# Patient Record
Sex: FEMALE | Race: BLACK OR AFRICAN AMERICAN | NOT HISPANIC OR LATINO | URBAN - METROPOLITAN AREA
[De-identification: names, ages, dates, MRNs, and addresses within clinical notes are randomized per-mention and may not be internally consistent; named-entity substitution may affect disease eponyms.]

---

## 2017-06-05 ENCOUNTER — HISTORICAL (OUTPATIENT)
Dept: ADMINISTRATIVE | Facility: HOSPITAL | Age: 47
End: 2017-06-05

## 2017-06-05 LAB
ABS NEUT (OLG): 2.1 X10(3)/MCL (ref 2.1–9.2)
ALBUMIN SERPL-MCNC: 4.2 GM/DL (ref 3.4–5)
ALBUMIN/GLOB SERPL: 1 {RATIO}
ALP SERPL-CCNC: 59 UNIT/L (ref 20–120)
ALT SERPL-CCNC: 17 UNIT/L
AST SERPL-CCNC: 20 UNIT/L
BASOPHILS # BLD AUTO: 0.1 X10(3)/MCL
BASOPHILS NFR BLD AUTO: 2 % (ref 0–1)
BILIRUB SERPL-MCNC: 0.7 MG/DL
BILIRUBIN DIRECT+TOT PNL SERPL-MCNC: <0.1 MG/DL
BILIRUBIN DIRECT+TOT PNL SERPL-MCNC: >0.6 MG/DL
BUN SERPL-MCNC: 10 MG/DL (ref 7–25)
CALCIUM SERPL-MCNC: 9.5 MG/DL (ref 8.4–10.3)
CHLORIDE SERPL-SCNC: 103 MMOL/L (ref 96–110)
CHOLEST SERPL-MCNC: 222 MG/DL
CHOLEST/HDLC SERPL: 3.2 {RATIO} (ref 0–4.4)
CO2 SERPL-SCNC: 27 MMOL/L (ref 24–32)
CREAT SERPL-MCNC: 0.66 MG/DL (ref 0.7–1.1)
EOSINOPHIL # BLD AUTO: 0.24 X10(3)/MCL
EOSINOPHIL NFR BLD AUTO: 5 % (ref 0–5)
ERYTHROCYTE [DISTWIDTH] IN BLOOD BY AUTOMATED COUNT: 17.7 % (ref 11.5–14.5)
EST. AVERAGE GLUCOSE BLD GHB EST-MCNC: 111 MG/DL
GLOBULIN SER-MCNC: 3.3 GM/ML (ref 2.3–3.5)
GLUCOSE SERPL-MCNC: 91 MG/DL (ref 65–99)
HBA1C MFR BLD: 5.5 % (ref 4.7–5.6)
HCT VFR BLD AUTO: 38.3 % (ref 35–46)
HDLC SERPL-MCNC: 70 MG/DL
HGB BLD-MCNC: 11.7 GM/DL (ref 12–16)
LDLC SERPL CALC-MCNC: 131 MG/DL (ref 0–130)
LYMPHOCYTES # BLD AUTO: 1.74 X10(3)/MCL
LYMPHOCYTES NFR BLD AUTO: 38 % (ref 15–40)
MCH RBC QN AUTO: 22.2 PG (ref 26–34)
MCHC RBC AUTO-ENTMCNC: 30.5 GM/DL (ref 31–37)
MCV RBC AUTO: 72.5 FL (ref 80–100)
MONOCYTES # BLD AUTO: 0.41 X10(3)/MCL
MONOCYTES NFR BLD AUTO: 9 % (ref 4–12)
NEUTROPHILS # BLD AUTO: 2.1 X10(3)/MCL
NEUTROPHILS NFR BLD AUTO: 46 X10(3)/MCL
PLATELET # BLD AUTO: 292 X10(3)/MCL (ref 130–400)
PMV BLD AUTO: 10.6 FL (ref 7.4–10.4)
POC BETA-HCG (QUAL): NEGATIVE
POTASSIUM SERPL-SCNC: 3.9 MMOL/L (ref 3.6–5.2)
PROT SERPL-MCNC: 7.5 GM/DL (ref 6–8)
RBC # BLD AUTO: 5.28 X10(6)/MCL (ref 4–5.2)
SODIUM SERPL-SCNC: 137 MMOL/L (ref 135–146)
TRIGL SERPL-MCNC: 106 MG/DL
VLDLC SERPL CALC-MCNC: 21 MG/DL
WBC # SPEC AUTO: 4.6 X10(3)/MCL (ref 4.5–11)

## 2017-07-12 ENCOUNTER — HISTORICAL (OUTPATIENT)
Dept: ADMINISTRATIVE | Facility: HOSPITAL | Age: 47
End: 2017-07-12

## 2017-07-12 LAB
APPEARANCE, UA: CLEAR
BACTERIA #/AREA URNS AUTO: ABNORMAL /[HPF]
BILIRUB UR QL STRIP: NEGATIVE
COLOR UR: NORMAL
GLUCOSE (UA): NORMAL
HGB UR QL STRIP: NEGATIVE
HYALINE CASTS #/AREA URNS LPF: ABNORMAL /[LPF]
KETONES UR QL STRIP: NEGATIVE
LEUKOCYTE ESTERASE UR QL STRIP: NEGATIVE
NITRITE UR QL STRIP: NEGATIVE
PH UR STRIP: 7 [PH] (ref 4.5–8)
PROT UR QL STRIP: NEGATIVE
RBC #/AREA URNS AUTO: ABNORMAL /[HPF]
SP GR UR STRIP: 1 (ref 1–1.03)
SQUAMOUS #/AREA URNS LPF: ABNORMAL /[LPF]
UROBILINOGEN UR STRIP-ACNC: NORMAL
WBC #/AREA URNS AUTO: ABNORMAL /HPF

## 2017-07-14 LAB — FINAL CULTURE: NO GROWTH

## 2017-09-18 ENCOUNTER — HISTORICAL (OUTPATIENT)
Dept: ADMINISTRATIVE | Facility: HOSPITAL | Age: 47
End: 2017-09-18

## 2017-09-18 LAB
APPEARANCE, UA: CLEAR
BACTERIA #/AREA URNS AUTO: ABNORMAL /[HPF]
BILIRUB UR QL STRIP: NEGATIVE
COLOR UR: NORMAL
GLUCOSE (UA): NORMAL
HGB UR QL STRIP: NEGATIVE
HYALINE CASTS #/AREA URNS LPF: ABNORMAL /[LPF]
KETONES UR QL STRIP: NEGATIVE
LEUKOCYTE ESTERASE UR QL STRIP: NEGATIVE
NITRITE UR QL STRIP: NEGATIVE
PH UR STRIP: 5.5 [PH] (ref 4.5–8)
PROT UR QL STRIP: NEGATIVE
RBC #/AREA URNS AUTO: ABNORMAL /[HPF]
SP GR UR STRIP: 1.01 (ref 1–1.03)
SQUAMOUS #/AREA URNS LPF: ABNORMAL /[LPF]
UROBILINOGEN UR STRIP-ACNC: NORMAL
WBC #/AREA URNS AUTO: ABNORMAL /HPF

## 2017-10-02 ENCOUNTER — HISTORICAL (OUTPATIENT)
Dept: UROLOGY | Facility: CLINIC | Age: 47
End: 2017-10-02

## 2017-10-02 LAB
BUN SERPL-MCNC: 11 MG/DL (ref 7–18)
CALCIUM SERPL-MCNC: 9.4 MG/DL (ref 8.5–10.1)
CHLORIDE SERPL-SCNC: 105 MMOL/L (ref 98–107)
CO2 SERPL-SCNC: 28 MMOL/L (ref 21–32)
CREAT SERPL-MCNC: 0.8 MG/DL (ref 0.6–1.3)
GLUCOSE SERPL-MCNC: 97 MG/DL (ref 74–106)
POTASSIUM SERPL-SCNC: 4.1 MMOL/L (ref 3.5–5.1)
SODIUM SERPL-SCNC: 137 MMOL/L (ref 136–145)

## 2017-10-19 ENCOUNTER — HISTORICAL (OUTPATIENT)
Dept: RADIOLOGY | Facility: HOSPITAL | Age: 47
End: 2017-10-19

## 2018-04-04 ENCOUNTER — HISTORICAL (OUTPATIENT)
Dept: ADMINISTRATIVE | Facility: HOSPITAL | Age: 48
End: 2018-04-04

## 2018-04-04 LAB
ABS NEUT (OLG): 4.03 X10(3)/MCL (ref 2.1–9.2)
APPEARANCE, UA: CLEAR
BACTERIA #/AREA URNS AUTO: ABNORMAL /[HPF]
BASOPHILS # BLD AUTO: 0.11 X10(3)/MCL
BASOPHILS NFR BLD AUTO: 2 %
BILIRUB UR QL STRIP: NEGATIVE
COLOR UR: NORMAL
EOSINOPHIL # BLD AUTO: 0.14 X10(3)/MCL
EOSINOPHIL NFR BLD AUTO: 2 %
ERYTHROCYTE [DISTWIDTH] IN BLOOD BY AUTOMATED COUNT: 20.1 % (ref 11.5–14.5)
GLUCOSE (UA): NORMAL
HCT VFR BLD AUTO: 33.3 % (ref 35–46)
HGB BLD-MCNC: 10.2 GM/DL (ref 12–16)
HGB UR QL STRIP: NEGATIVE
HYALINE CASTS #/AREA URNS LPF: ABNORMAL /[LPF]
IMM GRANULOCYTES # BLD AUTO: 0.02 10*3/UL
IMM GRANULOCYTES NFR BLD AUTO: 0 %
KETONES UR QL STRIP: NEGATIVE
LEUKOCYTE ESTERASE UR QL STRIP: NEGATIVE
LYMPHOCYTES # BLD AUTO: 2.02 X10(3)/MCL
LYMPHOCYTES NFR BLD AUTO: 29 % (ref 13–40)
MCH RBC QN AUTO: 22.4 PG (ref 26–34)
MCHC RBC AUTO-ENTMCNC: 30.6 GM/DL (ref 31–37)
MCV RBC AUTO: 73 FL (ref 80–100)
MONOCYTES # BLD AUTO: 0.67 X10(3)/MCL
MONOCYTES NFR BLD AUTO: 10 % (ref 4–12)
NEUTROPHILS # BLD AUTO: 4.03 X10(3)/MCL
NEUTROPHILS NFR BLD AUTO: 58 X10(3)/MCL
NITRITE UR QL STRIP: NEGATIVE
PH UR STRIP: 7 [PH] (ref 4.5–8)
PLATELET # BLD AUTO: 361 X10(3)/MCL (ref 130–400)
PMV BLD AUTO: 9.4 FL (ref 7.4–10.4)
PROT UR QL STRIP: NEGATIVE
RBC # BLD AUTO: 4.56 X10(6)/MCL (ref 4–5.2)
RBC #/AREA URNS AUTO: ABNORMAL /[HPF]
RET# (OHS): 0.13 X10(6)/MCL (ref 0.02–0.08)
RETICULOCYTE COUNT AUTOMATED (OLG): 2.8 % (ref 0.5–1.5)
SP GR UR STRIP: 1.01 (ref 1–1.03)
SQUAMOUS #/AREA URNS LPF: ABNORMAL /[LPF]
TSH SERPL-ACNC: 0.49 MIU/L (ref 0.36–3.74)
UROBILINOGEN UR STRIP-ACNC: NORMAL
WBC # SPEC AUTO: 7 X10(3)/MCL (ref 4.5–11)
WBC #/AREA URNS AUTO: ABNORMAL /HPF

## 2018-04-05 ENCOUNTER — HISTORICAL (OUTPATIENT)
Dept: FAMILY MEDICINE | Facility: CLINIC | Age: 48
End: 2018-04-05

## 2019-02-20 ENCOUNTER — HISTORICAL (OUTPATIENT)
Dept: RADIOLOGY | Facility: HOSPITAL | Age: 49
End: 2019-02-20

## 2019-06-25 ENCOUNTER — HISTORICAL (OUTPATIENT)
Dept: ADMINISTRATIVE | Facility: HOSPITAL | Age: 49
End: 2019-06-25

## 2019-06-25 LAB
ABS NEUT (OLG): 3.11 X10(3)/MCL (ref 2.1–9.2)
ALBUMIN SERPL-MCNC: 3.9 GM/DL (ref 3.4–5)
ALBUMIN/GLOB SERPL: 1.1 RATIO (ref 1.1–2)
ALP SERPL-CCNC: 82 UNIT/L (ref 45–117)
ALT SERPL-CCNC: 34 UNIT/L (ref 12–78)
AST SERPL-CCNC: 21 UNIT/L (ref 15–37)
BASOPHILS # BLD AUTO: 0.1 X10(3)/MCL
BASOPHILS NFR BLD AUTO: 2 %
BILIRUB SERPL-MCNC: 0.6 MG/DL (ref 0.2–1)
BILIRUBIN DIRECT+TOT PNL SERPL-MCNC: 0.1 MG/DL
BILIRUBIN DIRECT+TOT PNL SERPL-MCNC: 0.5 MG/DL
BUN SERPL-MCNC: 11 MG/DL (ref 7–18)
CALCIUM SERPL-MCNC: 9.7 MG/DL (ref 8.5–10.1)
CHLORIDE SERPL-SCNC: 102 MMOL/L (ref 98–107)
CO2 SERPL-SCNC: 32 MMOL/L (ref 21–32)
CREAT SERPL-MCNC: 0.7 MG/DL (ref 0.6–1.3)
EOSINOPHIL # BLD AUTO: 0.16 10*3/UL
EOSINOPHIL NFR BLD AUTO: 3 %
ERYTHROCYTE [DISTWIDTH] IN BLOOD BY AUTOMATED COUNT: 13.6 % (ref 11.5–14.5)
EST. AVERAGE GLUCOSE BLD GHB EST-MCNC: 123 MG/DL
GLOBULIN SER-MCNC: 3.7 GM/ML (ref 2.3–3.5)
GLUCOSE SERPL-MCNC: 94 MG/DL (ref 74–106)
HBA1C MFR BLD: 5.9 % (ref 4.2–6.3)
HCT VFR BLD AUTO: 44.9 % (ref 35–46)
HGB BLD-MCNC: 15 GM/DL (ref 12–16)
IMM GRANULOCYTES # BLD AUTO: 0.02 10*3/UL
IMM GRANULOCYTES NFR BLD AUTO: 0 %
LYMPHOCYTES # BLD AUTO: 1.65 X10(3)/MCL
LYMPHOCYTES NFR BLD AUTO: 29 % (ref 13–40)
MCH RBC QN AUTO: 29 PG (ref 26–34)
MCHC RBC AUTO-ENTMCNC: 33.4 GM/DL (ref 31–37)
MCV RBC AUTO: 86.8 FL (ref 80–100)
MONOCYTES # BLD AUTO: 0.57 X10(3)/MCL
MONOCYTES NFR BLD AUTO: 10 % (ref 4–12)
NEUTROPHILS # BLD AUTO: 3.11 X10(3)/MCL
NEUTROPHILS NFR BLD AUTO: 55 X10(3)/MCL
PLATELET # BLD AUTO: 244 X10(3)/MCL (ref 130–400)
PMV BLD AUTO: 11.4 FL (ref 7.4–10.4)
POTASSIUM SERPL-SCNC: 3.3 MMOL/L (ref 3.5–5.1)
PROT SERPL-MCNC: 7.6 GM/DL (ref 6.4–8.2)
RBC # BLD AUTO: 5.17 X10(6)/MCL (ref 4–5.2)
SODIUM SERPL-SCNC: 137 MMOL/L (ref 136–145)
TSH SERPL-ACNC: 0.47 MIU/L (ref 0.36–3.74)
WBC # SPEC AUTO: 5.6 X10(3)/MCL (ref 4.5–11)

## 2019-06-27 ENCOUNTER — HISTORICAL (OUTPATIENT)
Dept: FAMILY MEDICINE | Facility: CLINIC | Age: 49
End: 2019-06-27

## 2019-07-15 ENCOUNTER — HISTORICAL (OUTPATIENT)
Dept: FAMILY MEDICINE | Facility: CLINIC | Age: 49
End: 2019-07-15

## 2019-07-15 ENCOUNTER — HISTORICAL (OUTPATIENT)
Dept: ADMINISTRATIVE | Facility: HOSPITAL | Age: 49
End: 2019-07-15

## 2019-07-15 LAB
BUN SERPL-MCNC: 8 MG/DL (ref 7–18)
BUN SERPL-MCNC: 9 MG/DL (ref 7–18)
CALCIUM SERPL-MCNC: 8.9 MG/DL (ref 8.5–10.1)
CALCIUM SERPL-MCNC: 9.5 MG/DL (ref 8.5–10.1)
CHLORIDE SERPL-SCNC: 103 MMOL/L (ref 98–107)
CHLORIDE SERPL-SCNC: 104 MMOL/L (ref 98–107)
CHOLEST SERPL-MCNC: 197 MG/DL
CHOLEST/HDLC SERPL: 2.9 {RATIO} (ref 0–4.4)
CO2 SERPL-SCNC: 33 MMOL/L (ref 21–32)
CO2 SERPL-SCNC: 33 MMOL/L (ref 21–32)
CREAT SERPL-MCNC: 0.7 MG/DL (ref 0.6–1.3)
CREAT SERPL-MCNC: 0.8 MG/DL (ref 0.6–1.3)
CREAT/UREA NIT SERPL: 10
CREAT/UREA NIT SERPL: 13
GLUCOSE SERPL-MCNC: 104 MG/DL (ref 74–106)
GLUCOSE SERPL-MCNC: 97 MG/DL (ref 74–106)
HDLC SERPL-MCNC: 67 MG/DL
LDLC SERPL CALC-MCNC: 106 MG/DL (ref 0–130)
MAGNESIUM SERPL-MCNC: 2.3 MG/DL (ref 1.6–2.6)
PHOSPHATE SERPL-MCNC: 2.1 MG/DL (ref 2.5–4.9)
POTASSIUM SERPL-SCNC: 2.7 MMOL/L (ref 3.5–5.1)
POTASSIUM SERPL-SCNC: 3.1 MMOL/L (ref 3.5–5.1)
SODIUM SERPL-SCNC: 139 MMOL/L (ref 136–145)
SODIUM SERPL-SCNC: 141 MMOL/L (ref 136–145)
TRIGL SERPL-MCNC: 121 MG/DL
VLDLC SERPL CALC-MCNC: 24 MG/DL

## 2019-07-18 ENCOUNTER — HISTORICAL (OUTPATIENT)
Dept: ADMINISTRATIVE | Facility: HOSPITAL | Age: 49
End: 2019-07-18

## 2019-07-18 LAB
BUN SERPL-MCNC: 8 MG/DL (ref 7–18)
CALCIUM SERPL-MCNC: 10 MG/DL (ref 8.5–10.1)
CHLORIDE SERPL-SCNC: 104 MMOL/L (ref 98–107)
CO2 SERPL-SCNC: 29 MMOL/L (ref 21–32)
CREAT SERPL-MCNC: 0.7 MG/DL (ref 0.6–1.3)
CREAT/UREA NIT SERPL: 11
GLUCOSE SERPL-MCNC: 88 MG/DL (ref 74–106)
POTASSIUM SERPL-SCNC: 4.3 MMOL/L (ref 3.5–5.1)
SODIUM SERPL-SCNC: 137 MMOL/L (ref 136–145)

## 2019-12-11 ENCOUNTER — HISTORICAL (OUTPATIENT)
Dept: CARDIOLOGY | Facility: HOSPITAL | Age: 49
End: 2019-12-11

## 2020-03-20 ENCOUNTER — HISTORICAL (OUTPATIENT)
Dept: ADMINISTRATIVE | Facility: HOSPITAL | Age: 50
End: 2020-03-20

## 2020-03-20 LAB
FLUAV AG UPPER RESP QL IA.RAPID: NEGATIVE
FLUBV AG UPPER RESP QL IA.RAPID: NEGATIVE

## 2020-04-07 ENCOUNTER — HISTORICAL (OUTPATIENT)
Dept: ADMINISTRATIVE | Facility: HOSPITAL | Age: 50
End: 2020-04-07

## 2020-04-09 LAB — FINAL CULTURE: NORMAL

## 2020-06-15 ENCOUNTER — HISTORICAL (OUTPATIENT)
Dept: ADMINISTRATIVE | Facility: HOSPITAL | Age: 50
End: 2020-06-15

## 2020-06-15 LAB
ABS NEUT (OLG): 3.38 X10(3)/MCL (ref 2.1–9.2)
ALBUMIN SERPL-MCNC: 3.9 GM/DL (ref 3.4–5)
ALBUMIN/GLOB SERPL: 1.1 RATIO (ref 1.1–2)
ALP SERPL-CCNC: 80 UNIT/L (ref 45–117)
ALT SERPL-CCNC: 30 UNIT/L (ref 12–78)
AST SERPL-CCNC: 18 UNIT/L (ref 15–37)
BASOPHILS # BLD AUTO: 0.1 X10(3)/MCL (ref 0–0.2)
BASOPHILS NFR BLD AUTO: 1 %
BILIRUB SERPL-MCNC: 0.4 MG/DL (ref 0.2–1)
BILIRUBIN DIRECT+TOT PNL SERPL-MCNC: 0.1 MG/DL (ref 0–0.2)
BILIRUBIN DIRECT+TOT PNL SERPL-MCNC: 0.3 MG/DL
BUN SERPL-MCNC: 7 MG/DL (ref 7–18)
CALCIUM SERPL-MCNC: 9.4 MG/DL (ref 8.5–10.1)
CHLORIDE SERPL-SCNC: 105 MMOL/L (ref 98–107)
CHOLEST SERPL-MCNC: 212 MG/DL
CHOLEST/HDLC SERPL: 2.6 {RATIO} (ref 0–4.4)
CO2 SERPL-SCNC: 28 MMOL/L (ref 21–32)
CREAT SERPL-MCNC: 0.7 MG/DL (ref 0.6–1.3)
CREAT UR-MCNC: 40 MG/DL
EOSINOPHIL # BLD AUTO: 0.1 X10(3)/MCL (ref 0–0.9)
EOSINOPHIL NFR BLD AUTO: 2 %
ERYTHROCYTE [DISTWIDTH] IN BLOOD BY AUTOMATED COUNT: 13.1 % (ref 11.5–14.5)
EST. AVERAGE GLUCOSE BLD GHB EST-MCNC: 108 MG/DL
GLOBULIN SER-MCNC: 3.7 GM/ML (ref 2.3–3.5)
GLUCOSE SERPL-MCNC: 95 MG/DL (ref 74–106)
HBA1C MFR BLD: 5.4 % (ref 4.2–6.3)
HCT VFR BLD AUTO: 40.3 % (ref 35–46)
HDLC SERPL-MCNC: 82 MG/DL (ref 40–59)
HGB BLD-MCNC: 13.4 GM/DL (ref 12–16)
IMM GRANULOCYTES # BLD AUTO: 0.02 10*3/UL
IMM GRANULOCYTES NFR BLD AUTO: 0 %
LDLC SERPL CALC-MCNC: 102 MG/DL
LYMPHOCYTES # BLD AUTO: 1.7 X10(3)/MCL (ref 0.6–4.6)
LYMPHOCYTES NFR BLD AUTO: 29 %
MCH RBC QN AUTO: 29.2 PG (ref 26–34)
MCHC RBC AUTO-ENTMCNC: 33.3 GM/DL (ref 31–37)
MCV RBC AUTO: 87.8 FL (ref 80–100)
MICROALBUMIN UR-MCNC: 6.1 MG/L (ref 0–19)
MICROALBUMIN/CREAT RATIO PNL UR: 15.2 MCG/MG CR (ref 0–29)
MONOCYTES # BLD AUTO: 0.6 X10(3)/MCL (ref 0.1–1.3)
MONOCYTES NFR BLD AUTO: 10 %
NEUTROPHILS # BLD AUTO: 3.38 X10(3)/MCL (ref 2.1–9.2)
NEUTROPHILS NFR BLD AUTO: 57 %
PLATELET # BLD AUTO: 212 X10(3)/MCL (ref 130–400)
PMV BLD AUTO: 10.8 FL (ref 7.4–10.4)
POTASSIUM SERPL-SCNC: 4.6 MMOL/L (ref 3.5–5.1)
PROT SERPL-MCNC: 7.6 GM/DL (ref 6.4–8.2)
RBC # BLD AUTO: 4.59 X10(6)/MCL (ref 4–5.2)
SODIUM SERPL-SCNC: 138 MMOL/L (ref 136–145)
TRIGL SERPL-MCNC: 141 MG/DL
TSH SERPL-ACNC: 1.45 MIU/L (ref 0.36–3.74)
VLDLC SERPL CALC-MCNC: 28 MG/DL
WBC # SPEC AUTO: 6 X10(3)/MCL (ref 4.5–11)

## 2021-04-20 LAB — CRC RECOMMENDATION EXT: NORMAL

## 2021-05-24 LAB
HUMAN PAPILLOMAVIRUS (HPV): NORMAL
PAP RECOMMENDATION EXT: NORMAL
PAP SMEAR: NORMAL

## 2022-04-11 ENCOUNTER — HISTORICAL (OUTPATIENT)
Dept: ADMINISTRATIVE | Facility: HOSPITAL | Age: 52
End: 2022-04-11

## 2022-04-28 VITALS
SYSTOLIC BLOOD PRESSURE: 125 MMHG | BODY MASS INDEX: 35.68 KG/M2 | WEIGHT: 209 LBS | HEIGHT: 64 IN | DIASTOLIC BLOOD PRESSURE: 85 MMHG | OXYGEN SATURATION: 99 %

## 2022-04-30 NOTE — PROGRESS NOTES
Patient:   Kelly Levin             MRN: [id number]            FIN: [id number]               Age:   47 years     Sex:  Female     :  1970   Associated Diagnoses:   Acute UTI; Dysuria   Author:   Palomo Jackson MD      CC: Increased urinary frequency, dysuria x 3 days    HPI:   Pt is 47 AAF who presents to [facility]IC because of dysuria, increased frequency over past few days. Pt reports she began experiencing frequent urination despite decreased intake about three days ago, noticed mild burning with urination as well. Reports feeling of clear discharge, no odor, denies new sexual contacts. States she always uses condoms with sexual intercourse. No other major issues today. Denies abdominal pain, fever, N/V, malaise, hematuria.           Review of Systems   Constitutional:  No fever, No chills, No fatigue.    Respiratory:  No shortness of breath, No cough, No wheezing.    Cardiovascular:  No chest pain.    Gastrointestinal:  No nausea, No vomiting, No diarrhea, No constipation.    Genitourinary:  Dysuria, No hematuria.    Immunologic:  No malaise.       Health Status   Current medications:    Home Medications (7) Active  Allegra 24 Hour Allergy   Bactrim  mg-160 mg oral tablet 1 tab(s), Oral, BID  Mobic 7.5 mg oral tablet 7.5 mg = 1 tab(s), PRN, Oral, BID  One-A-Day Essentials oral tablet 1 tab(s), Oral, Daily  pravastatin 20 mg oral tablet 20 mg = 1 tab(s), Oral, Daily  Zegerid 20 mg-1100 mg oral capsule 1 cap(s), Oral, Daily  ZyrTEC         Physical Examination   Vital Signs   2017 9:06 CDT       Temperature Oral          36.8 DegC                             Peripheral Pulse Rate     89 bpm                             Respiratory Rate          20 br/min                             Systolic Blood Pressure   134 mmHg                             Diastolic Blood Pressure  82 mmHg     General:  Alert and oriented, No acute distress.    Respiratory:  Lungs are clear to auscultation, Respirations  are non-labored, Breath sounds are equal, Symmetrical chest wall expansion.    Cardiovascular:  Normal rate, Regular rhythm, No murmur, Normal peripheral perfusion.    Gastrointestinal:  Soft, Non-tender, Non-distended, Normal bowel sounds.    Genitourinary:  No costovertebral angle tenderness.    Musculoskeletal:  Normal range of motion.    Integumentary:  Warm, Dry.    Neurologic:  Alert, Oriented, No focal deficits.       Health Maintenance      Impression and Plan   Diagnosis     Acute UTI (ISU37-RN N39.0).     Dysuria (ZFJ70-PC R30.0).       - Provided pt with Bactrim x 3 days, sent urine for UA and urine culture  - Will tailor abx if necessary  - Instructed pt to come for f/u if discharge not resolved in 2 weeks  - WIC/ED precautions reviewed    RTC in 2 wks if necessary, otherwise keep previously scheduled appt

## 2022-05-11 ENCOUNTER — TELEPHONE (OUTPATIENT)
Dept: ENDOSCOPY | Facility: HOSPITAL | Age: 52
End: 2022-05-11

## 2022-09-21 LAB — BCS RECOMMENDATION EXT: NORMAL

## 2023-01-24 LAB
CHOLEST SERPL-MSCNC: 233 MG/DL (ref 0–200)
HBA1C MFR BLD: 5.3 %
HDLC SERPL-MCNC: 89 MG/DL (ref 35–70)
LDLC SERPL CALC-MCNC: 122 MG/DL (ref 0–160)
TRIGL SERPL-MCNC: 111 MG/DL (ref 40–160)

## 2023-04-27 PROBLEM — G43.829 MENSTRUAL MIGRAINE WITHOUT STATUS MIGRAINOSUS, NOT INTRACTABLE: Status: ACTIVE | Noted: 2023-04-27

## 2023-04-27 PROBLEM — E78.2 MIXED HYPERLIPIDEMIA: Status: ACTIVE | Noted: 2023-04-27

## 2023-04-27 PROBLEM — K21.9 GASTRIC REFLUX: Status: ACTIVE | Noted: 2023-04-27

## 2023-04-27 PROBLEM — J30.2 SEASONAL ALLERGIES: Status: ACTIVE | Noted: 2023-04-27

## 2023-04-27 PROBLEM — I10 ESSENTIAL HYPERTENSION: Status: ACTIVE | Noted: 2023-04-27

## 2023-05-15 DIAGNOSIS — J30.2 SEASONAL ALLERGIES: Primary | ICD-10-CM

## 2023-05-15 RX ORDER — LORATADINE 10 MG/1
10 TABLET ORAL DAILY
Qty: 90 TABLET | Refills: 3 | Status: SHIPPED | OUTPATIENT
Start: 2023-05-15

## 2023-05-15 RX ORDER — LORATADINE 10 MG/1
10 TABLET ORAL DAILY
COMMUNITY
Start: 2023-04-05 | End: 2023-05-15 | Stop reason: SDUPTHER

## 2023-06-19 ENCOUNTER — DOCUMENTATION ONLY (OUTPATIENT)
Dept: FAMILY MEDICINE | Facility: CLINIC | Age: 53
End: 2023-06-19

## 2023-06-19 ENCOUNTER — OFFICE VISIT (OUTPATIENT)
Dept: FAMILY MEDICINE | Facility: CLINIC | Age: 53
End: 2023-06-19
Payer: COMMERCIAL

## 2023-06-19 ENCOUNTER — PATIENT OUTREACH (OUTPATIENT)
Dept: ADMINISTRATIVE | Facility: HOSPITAL | Age: 53
End: 2023-06-19
Payer: COMMERCIAL

## 2023-06-19 VITALS
HEIGHT: 64 IN | TEMPERATURE: 98 F | DIASTOLIC BLOOD PRESSURE: 87 MMHG | SYSTOLIC BLOOD PRESSURE: 125 MMHG | OXYGEN SATURATION: 98 % | WEIGHT: 167.19 LBS | HEART RATE: 78 BPM | BODY MASS INDEX: 28.54 KG/M2

## 2023-06-19 DIAGNOSIS — J30.2 SEASONAL ALLERGIES: ICD-10-CM

## 2023-06-19 DIAGNOSIS — Z00.00 WELLNESS EXAMINATION: Primary | ICD-10-CM

## 2023-06-19 DIAGNOSIS — I10 ESSENTIAL HYPERTENSION: ICD-10-CM

## 2023-06-19 DIAGNOSIS — K21.9 GASTRIC REFLUX: ICD-10-CM

## 2023-06-19 DIAGNOSIS — E78.2 MIXED HYPERLIPIDEMIA: ICD-10-CM

## 2023-06-19 DIAGNOSIS — G43.829 MENSTRUAL MIGRAINE WITHOUT STATUS MIGRAINOSUS, NOT INTRACTABLE: ICD-10-CM

## 2023-06-19 DIAGNOSIS — F32.A ANXIETY AND DEPRESSION: ICD-10-CM

## 2023-06-19 DIAGNOSIS — F41.9 ANXIETY AND DEPRESSION: ICD-10-CM

## 2023-06-19 PROCEDURE — 1159F MED LIST DOCD IN RCRD: CPT | Mod: CPTII,,, | Performed by: FAMILY MEDICINE

## 2023-06-19 PROCEDURE — 99396 PREV VISIT EST AGE 40-64: CPT | Mod: ,,, | Performed by: FAMILY MEDICINE

## 2023-06-19 PROCEDURE — 99396 PR PREVENTIVE VISIT,EST,40-64: ICD-10-PCS | Mod: ,,, | Performed by: FAMILY MEDICINE

## 2023-06-19 PROCEDURE — 3044F HG A1C LEVEL LT 7.0%: CPT | Mod: CPTII,,, | Performed by: FAMILY MEDICINE

## 2023-06-19 PROCEDURE — 3074F PR MOST RECENT SYSTOLIC BLOOD PRESSURE < 130 MM HG: ICD-10-PCS | Mod: CPTII,,, | Performed by: FAMILY MEDICINE

## 2023-06-19 PROCEDURE — 3008F BODY MASS INDEX DOCD: CPT | Mod: CPTII,,, | Performed by: FAMILY MEDICINE

## 2023-06-19 PROCEDURE — 3079F PR MOST RECENT DIASTOLIC BLOOD PRESSURE 80-89 MM HG: ICD-10-PCS | Mod: CPTII,,, | Performed by: FAMILY MEDICINE

## 2023-06-19 PROCEDURE — 4010F PR ACE/ARB THEARPY RXD/TAKEN: ICD-10-PCS | Mod: CPTII,,, | Performed by: FAMILY MEDICINE

## 2023-06-19 PROCEDURE — 3074F SYST BP LT 130 MM HG: CPT | Mod: CPTII,,, | Performed by: FAMILY MEDICINE

## 2023-06-19 PROCEDURE — 1159F PR MEDICATION LIST DOCUMENTED IN MEDICAL RECORD: ICD-10-PCS | Mod: CPTII,,, | Performed by: FAMILY MEDICINE

## 2023-06-19 PROCEDURE — 4010F ACE/ARB THERAPY RXD/TAKEN: CPT | Mod: CPTII,,, | Performed by: FAMILY MEDICINE

## 2023-06-19 PROCEDURE — 3079F DIAST BP 80-89 MM HG: CPT | Mod: CPTII,,, | Performed by: FAMILY MEDICINE

## 2023-06-19 PROCEDURE — 3008F PR BODY MASS INDEX (BMI) DOCUMENTED: ICD-10-PCS | Mod: CPTII,,, | Performed by: FAMILY MEDICINE

## 2023-06-19 PROCEDURE — 3044F PR MOST RECENT HEMOGLOBIN A1C LEVEL <7.0%: ICD-10-PCS | Mod: CPTII,,, | Performed by: FAMILY MEDICINE

## 2023-06-19 RX ORDER — RIZATRIPTAN BENZOATE 10 MG/1
10 TABLET, ORALLY DISINTEGRATING ORAL
COMMUNITY
Start: 2023-03-10

## 2023-06-19 RX ORDER — ESCITALOPRAM OXALATE 5 MG/1
5 TABLET ORAL DAILY
Qty: 30 TABLET | Refills: 1 | Status: SHIPPED | OUTPATIENT
Start: 2023-06-19 | End: 2023-08-09

## 2023-06-19 RX ORDER — PANTOPRAZOLE SODIUM 40 MG/1
40 TABLET, DELAYED RELEASE ORAL DAILY
COMMUNITY
End: 2023-07-26

## 2023-06-19 NOTE — PROGRESS NOTES
The following record(s)  below were uploaded for Health Maintenance .            PAP SMEAR  2021

## 2023-06-19 NOTE — LETTER
"  This communication is flagged as high priority.        AUTHORIZATION FOR RELEASE OF   CONFIDENTIAL INFORMATION    Dear Staff CYRUS Soria,    We are seeing Kelly Levin, date of birth 1970, in the clinic at [facility]. Alice Beaulieu MD is the patient's PCP. Kelly Levin has an outstanding lab/procedure at the time we reviewed her chart. In order to help keep her health information updated, she has authorized us to request the following medical record(s):        (  )  MAMMOGRAM                                      (  )  COLONOSCOPY      (xx  )  PAP SMEAR                                          (  )  OUTSIDE LAB RESULTS     (  )  DEXA SCAN                                          (  )  EYE EXAM            (  )  FOOT EXAM                                          (  )  ENTIRE RECORD     (  )  OUTSIDE IMMUNIZATIONS                 (  )  _______________         Please fax records to [facility], Indira Gautam, MD,  [phone]      If you have any questions, please contact Bri Humphries (Connie)Care Coordinator @ [phone]     Patient Name: Kelly Levin  : 1970  Patient Phone #: [phone]     "

## 2023-06-19 NOTE — LETTER
"  This communication is flagged as high priority.        AUTHORIZATION FOR RELEASE OF   CONFIDENTIAL INFORMATION    Dear Staff,    We are seeing Kelly Levin, date of birth 1970, in the clinic at [facility] [place] [facility]. Alice Beaulieu MD is the patient's PCP. Kelly Levin has an outstanding lab/procedure at the time we reviewed her chart. In order to help keep her health information updated, she has authorized us to request the following medical record(s):        (  )  MAMMOGRAM                                      (xx  )  COLONOSCOPY/Path      (  )  PAP SMEAR                                          (  )  OUTSIDE LAB RESULTS     (  )  DEXA SCAN                                          (  )  EYE EXAM            (  )  FOOT EXAM                                          (  )  ENTIRE RECORD     (  )  OUTSIDE IMMUNIZATIONS                 (  )  _______________         Please fax records to [facility], Indira Gautam, MD,  [phone]        If you have any questions, please contact Bri Humphries (Connie)Care Coordinator @ [phone]       Patient Name: Kelly Levin  : 1970  Patient Phone #: [phone]     "

## 2023-06-19 NOTE — PROGRESS NOTES
Population Health Outreach.    Requested :Pap smear Tammie Dawn  Colon Screening Arabella  Mammogram [facility]

## 2023-06-19 NOTE — PROGRESS NOTES
Patient ID: [id number]     Chief Complaint: Annual Exam        HPI:     Kelly Levin is a 53 y.o. female here today for an annual wellness.  Patient had basic labs completed at work.   1) Has been having problems with anxiety for the last few years seems to be getting worse over time.  No recent acute stressors.  Symptoms include  feeling nervous, anxious, on edge, not being able to stop control worrying, trouble relaxing, becoming easily annoyed or irritable -which then leads to sadness, tearfulness-being overwhelmed.  Stressors and coping mechanisms discussed at length.   1) Hypertension: Patient has been taking medicine daily. BP is normal at home. No symptoms associated with increased BP such as headache/ visual changes/ dizziness/ chest pain.   2) Hyperlipidemia: Patient is taking medication at Night. Trying to follow modify diet. Increase activity. No side effects of medication noted.  3)  Reflux: Patient is continuing to take medication-no symptoms associated with reflux.  No noticeable side effects of medication.   4)  Migraine headaches:  Frequency/intensity have been controlled with use of medication. No acute complaints.          Past Medical History:   Diagnosis Date    Essential hypertension 4/27/2023    Gastric reflux 4/27/2023    Menstrual migraine without status migrainosus, not intractable 4/27/2023    Mixed hyperlipidemia 4/27/2023    Seasonal allergies 4/27/2023        Past Surgical History:   Procedure Laterality Date    REDUCTION OF BOTH BREASTS Bilateral 02/22/2022        Social History     Tobacco Use    Smoking status: Never    Smokeless tobacco: Never   Substance Use Topics    Alcohol use: Not Currently    Drug use: Never        Social History     Socioeconomic History    Marital status: Single    Number of children: 3        Current Outpatient Medications   Medication Instructions    EScitalopram oxalate (LEXAPRO) 5 mg, Oral, Daily    loratadine (CLARITIN) 10 mg, Oral, Daily     "losartan (COZAAR) 100 MG tablet Take 1 tablet by mouth once daily for 90 days    pantoprazole (PROTONIX) 40 mg, Oral, Daily    pravastatin (PRAVACHOL) 20 MG tablet Take 1 tablet by mouth once daily for 90 days    rizatriptan (MAXALT-MLT) 10 mg, Oral       Review of patient's allergies indicates:   Allergen Reactions    Ace inhibitors      Other reaction(s): Cough    Amlodipine Edema        Patient Care Team:  Alice Beaulieu MD as PCP - General (Family Medicine)  Tammie Dawn MD as Consulting Physician (Obstetrics and Gynecology)  JUDITH Bell MD as Consulting Physician (Gastroenterology)  Raul Will Jr., MD as Consulting Physician (Otolaryngology)     Subjective:     Review of Systems    12 point review of systems conducted, negative except as stated in the history of present illness. See HPI for details.      Objective:     Visit Vitals  /87   Pulse 78   Temp 98 °F (36.7 °C)   Ht 5' 4" (1.626 m)   Wt 75.8 kg (167 lb 3.2 oz)   SpO2 98%   BMI 28.70 kg/m²       Physical Exam    General: Alert and oriented, No acute distress.  Head: Normocephalic, Atraumatic.  Eye: Pupils are equal, round and reactive to light, Extraocular movements are intact, Sclera non-icteric.  Ears/Nose/Throat: Normal, Mucosa moist,Clear.  Neck/Thyroid: Supple, Non-tender, No palpable thyromegaly or thyroid nodule, No lymphadenopathy, Full range of motion.  Respiratory: Clear to auscultation bilaterally; No wheezes, rales or rhonchi  Cardiovascular: Regular rate and rhythm, S1/S2 normal, No murmurs, rubs or gallops.  Gastrointestinal: Soft, Non-tender, Non-distended, Normal bowel sounds, No palpable organomegaly.  Musculoskeletal: Normal range of motion.  Integumentary: Warm, Dry, Intact, No suspicious lesions or rashes.  Extremities: No clubbing, cyanosis or edema  Neurologic: No focal deficits, Cranial Nerves II-XII are grossly intact, Motor strength normal upper and lower extremities, Sensory exam intact.  Psychiatric: " "Normal interaction, Coherent speech, Euthymic mood, Appropriate affect       Assessment:       ICD-10-CM ICD-9-CM   1. Wellness examination  Z00.00 V70.0   2. Anxiety and depression  F41.9 300.00    F32.A 311   3. Essential hypertension  I10 401.9   4. Mixed hyperlipidemia  E78.2 272.2   5. Gastric reflux  K21.9 530.81   6. Seasonal allergies  J30.2 477.9   7. Menstrual migraine without status migrainosus, not intractable  G43.829 346.40        Plan:     Cervical Cancer Screening - Up to Date    Breast Cancer Screening - Up to Date    Colon Cancer Screening - Up to Date    Eye Exam - Recommend annually.    Dental Exam - Recommend biannual exams.     Vaccinations -   Immunization History   Administered Date(s) Administered    COVID-19, MRNA, LN-S, PF (Pfizer) (Gray Cap) 05/27/2022    COVID-19, MRNA, LN-S, PF (Pfizer) (Purple Cap) 03/07/2021, 03/28/2021, 01/06/2022    DT (Pediatric) 02/27/1975, 05/10/1985    DTP [birth year], 08/25/1970    Influenza 10/27/2010, 10/14/2011, 10/02/2017    Influenza - Quadrivalent 09/21/2016    Influenza - Quadrivalent - MDCK - PF 10/17/2019, 01/06/2022    Influenza - Quadrivalent - PF (6-35 months) 10/02/2017    Influenza - Quadrivalent - PF *Preferred* (6 months and older) 09/28/2020    Measles 03/05/1970    Measles / Rubella 06/27/1974, 03/19/1976    Poliovirus 1970, 1970, 08/25/1970, 06/27/1974, 03/19/1976    Td - PF (ADULT) 11/03/2008    Tdap 12/12/2017    Varicella 03/19/1976    Zoster Recombinant 01/06/2022, 04/11/2022          1. Wellness examination  Wellness: Five Rules Reviewed: Water/Nutrition-Real Food, Limit Fast Food/ Exercise 20-30 minutes most days of the week/ Mental health- "Is your work a calling or paycheck" Define 'What is your purpose-what matters to you' Stress- Is an Individual Response- Therefore Can be Controlled by the Individual. Meditation/ Immunizations/Multivitamin use-Vitamin D3/ Screenings-Patient had lipid " panel/hemoglobin A1c completed at work-these results were discussed.  Additional labs were given.    - CBC Auto Differential; Future  - Comprehensive Metabolic Panel; Future  - TSH; Future  - Urinalysis; Future  - Hepatitis C Antibody; Future  - HIV 1/2 Ag/Ab (4th Gen); Future  - Urinalysis    2. Anxiety and depression  Depression/Anxiety: Patient started on medication for depression/anxiety- continue to encourage lifestyle modifications including 20-30 minutes exercise daily/ meditation/ empower self through compassion. Patient feels well supported-does have individuals to talk to regarding her mood. Pathophysiology/Treatment/ Dangers Discussed.  Patient to call office with update.    - EScitalopram oxalate (LEXAPRO) 5 MG Tab; Take 1 tablet (5 mg total) by mouth once daily.  Dispense: 30 tablet; Refill: 1    3. Essential hypertension  Patient has been taking medication-losartan 100 mg. Blood pressure goal of less than 130/80-blood pressure is stable. Continue to encourage diet and activity modifications. Including stress management. Pathophysiology/treatment/dangers discussed.      4. Mixed hyperlipidemia  Lab Results   Component Value Date    CHOL 233 (A) 01/24/2023     06/15/2020    TRIG 111 01/24/2023    HDL 89 (A) 01/24/2023     Pathophysiology/treatment/dangers discussed. Patient to continue diet modification/increase pravastatin to 40 mg.   Total cholesterol 233 ( Goal less than 200) HDL 89 ( Goal high as possible)  ( Goal less than 100) Triglycerides 111 ( Goal less than 150)      5. Gastric reflux  Patient is currently stable.  No acute modifications recommended.  Continue with current treatment-Protonix 40 mg.    6. Seasonal allergies  Patient is currently stable-continue with Claritin 10 mg.  No acute modifications recommended.  Continue with current treatment.    7. Menstrual migraine without status migrainosus, not intractable  Patient is currently stable.  No acute modifications  recommended.  Continue with current treatment-Maxalt.            The patient's Health Maintenance was reviewed and the following appears to be due at this time:   Health Maintenance Due   Topic Date Due    Hepatitis C Screening  Never done    Cervical Cancer Screening  Never done    HIV Screening  Never done    Mammogram  Never done    Colorectal Cancer Screening  Never done    COVID-19 Vaccine (5 - Pfizer series) 07/22/2022    Hemoglobin A1c (Diabetic Prevention Screening)  06/15/2023         Follow up in about 6 weeks (around 7/31/2023). In addition to their scheduled follow up, the patient has also been instructed to follow up on as needed basis.     Future Appointments   Date Time Provider Department Center   7/24/2023  9:45 AM Alice Beaulieu MD [facility] [place] [facility] Beaulieu MD

## 2023-06-19 NOTE — LETTER
"  This communication is flagged as high priority.        AUTHORIZATION FOR RELEASE OF   CONFIDENTIAL INFORMATION    Dear FIDENCIO,    We are seeing Kelly Levin, date of birth 1970, in the clinic at [facility]. Alice Beaulieu MD is the patient's PCP. Kelly Levin has an outstanding lab/procedure at the time we reviewed her chart. In order to help keep her health information updated, she has authorized us to request the following medical record(s):        (  xx)  MAMMOGRAM                                      (  )  COLONOSCOPY      (  )  PAP SMEAR                                          (  )  OUTSIDE LAB RESULTS     (  )  DEXA SCAN                                          (  )  EYE EXAM            (  )  FOOT EXAM                                          (  )  ENTIRE RECORD     (  )  OUTSIDE IMMUNIZATIONS                 (  )  _______________         Please fax records to [facility], Indira Gautam, MD,  [phone]      If you have any questions, please contact Bri Humphries (Connie)Care Coordinator @ [phone]       Patient Name: Kelly Levin  : 1970  Patient Phone #: [phone]     "

## 2023-06-20 ENCOUNTER — PATIENT OUTREACH (OUTPATIENT)
Dept: ADMINISTRATIVE | Facility: HOSPITAL | Age: 53
End: 2023-06-20
Payer: COMMERCIAL

## 2023-06-20 NOTE — PROGRESS NOTES
The following record(s)  below were uploaded for Health Maintenance .    MAMMOGRAM SCREENING    09/21/2022        Colonoscopy 04/20/2021

## 2023-06-27 DIAGNOSIS — E78.2 MIXED HYPERLIPIDEMIA: Primary | ICD-10-CM

## 2023-06-27 RX ORDER — PRAVASTATIN SODIUM 40 MG/1
40 TABLET ORAL DAILY
Qty: 90 TABLET | Refills: 3 | Status: SHIPPED | OUTPATIENT
Start: 2023-06-27 | End: 2024-06-26

## 2023-07-26 DIAGNOSIS — K21.9 GASTRIC REFLUX: Primary | ICD-10-CM

## 2023-07-26 RX ORDER — PANTOPRAZOLE SODIUM 40 MG/1
TABLET, DELAYED RELEASE ORAL
Qty: 90 TABLET | Refills: 0 | Status: SHIPPED | OUTPATIENT
Start: 2023-07-26 | End: 2023-11-08

## 2023-08-07 DIAGNOSIS — F32.A ANXIETY AND DEPRESSION: ICD-10-CM

## 2023-08-07 DIAGNOSIS — F41.9 ANXIETY AND DEPRESSION: ICD-10-CM

## 2023-08-09 RX ORDER — ESCITALOPRAM OXALATE 5 MG/1
5 TABLET ORAL
Qty: 30 TABLET | Refills: 0 | Status: SHIPPED | OUTPATIENT
Start: 2023-08-09 | End: 2024-01-31

## 2023-10-23 DIAGNOSIS — I10 ESSENTIAL HYPERTENSION: Primary | ICD-10-CM

## 2023-10-23 RX ORDER — LOSARTAN POTASSIUM 100 MG/1
TABLET ORAL
Qty: 90 TABLET | Refills: 0 | Status: SHIPPED | OUTPATIENT
Start: 2023-10-23 | End: 2024-01-17

## 2023-11-08 DIAGNOSIS — K21.9 GASTRIC REFLUX: ICD-10-CM

## 2023-11-08 RX ORDER — PANTOPRAZOLE SODIUM 40 MG/1
TABLET, DELAYED RELEASE ORAL
Qty: 90 TABLET | Refills: 0 | Status: SHIPPED | OUTPATIENT
Start: 2023-11-08 | End: 2024-02-22

## 2024-01-17 DIAGNOSIS — I10 ESSENTIAL HYPERTENSION: ICD-10-CM

## 2024-01-17 RX ORDER — LOSARTAN POTASSIUM 100 MG/1
100 TABLET ORAL
Qty: 90 TABLET | Refills: 0 | Status: SHIPPED | OUTPATIENT
Start: 2024-01-17 | End: 2024-04-15

## 2024-01-30 DIAGNOSIS — F41.9 ANXIETY AND DEPRESSION: ICD-10-CM

## 2024-01-30 DIAGNOSIS — F32.A ANXIETY AND DEPRESSION: ICD-10-CM

## 2024-01-31 RX ORDER — ESCITALOPRAM OXALATE 5 MG/1
5 TABLET ORAL
Qty: 90 TABLET | Refills: 0 | Status: SHIPPED | OUTPATIENT
Start: 2024-01-31 | End: 2024-06-03

## 2024-02-22 DIAGNOSIS — K21.9 GASTRIC REFLUX: ICD-10-CM

## 2024-02-22 RX ORDER — PANTOPRAZOLE SODIUM 40 MG/1
TABLET, DELAYED RELEASE ORAL
Qty: 90 TABLET | Refills: 0 | Status: SHIPPED | OUTPATIENT
Start: 2024-02-22 | End: 2024-06-03 | Stop reason: SDUPTHER

## 2024-04-15 DIAGNOSIS — I10 ESSENTIAL HYPERTENSION: ICD-10-CM

## 2024-04-15 RX ORDER — LOSARTAN POTASSIUM 100 MG/1
100 TABLET ORAL
Qty: 90 TABLET | Refills: 0 | Status: SHIPPED | OUTPATIENT
Start: 2024-04-15 | End: 2024-06-03 | Stop reason: SDUPTHER

## 2024-05-27 ENCOUNTER — TELEPHONE (OUTPATIENT)
Dept: FAMILY MEDICINE | Facility: CLINIC | Age: 54
End: 2024-05-27
Payer: COMMERCIAL

## 2024-05-27 NOTE — TELEPHONE ENCOUNTER
Are there any outstanding tasks in the patients's chart (ex.labs,MM,etc)?  no  Do we have outstanding/pending referrals?  no  Has the patient been seen in and ER,UCC, or been admitted since last visit?  no  Has the patient seen any other health care provider(doctors) since last visit?  Yes, Dr Beaulieu  Has the patient had any bloodwork or x-rays done since last visit?  Yes      Last wellness was 06/19/2023

## 2024-06-03 ENCOUNTER — OFFICE VISIT (OUTPATIENT)
Dept: FAMILY MEDICINE | Facility: CLINIC | Age: 54
End: 2024-06-03
Payer: COMMERCIAL

## 2024-06-03 VITALS
SYSTOLIC BLOOD PRESSURE: 127 MMHG | OXYGEN SATURATION: 98 % | BODY MASS INDEX: 31.41 KG/M2 | DIASTOLIC BLOOD PRESSURE: 89 MMHG | HEART RATE: 79 BPM | WEIGHT: 184 LBS | RESPIRATION RATE: 16 BRPM | HEIGHT: 64 IN

## 2024-06-03 DIAGNOSIS — Z00.00 WELLNESS EXAMINATION: Primary | ICD-10-CM

## 2024-06-03 DIAGNOSIS — K21.9 GASTRIC REFLUX: ICD-10-CM

## 2024-06-03 DIAGNOSIS — I10 ESSENTIAL HYPERTENSION: ICD-10-CM

## 2024-06-03 DIAGNOSIS — F41.1 GENERALIZED ANXIETY DISORDER: Primary | ICD-10-CM

## 2024-06-03 DIAGNOSIS — E66.09 CLASS 1 OBESITY DUE TO EXCESS CALORIES WITH SERIOUS COMORBIDITY AND BODY MASS INDEX (BMI) OF 31.0 TO 31.9 IN ADULT: ICD-10-CM

## 2024-06-03 DIAGNOSIS — G43.829 MENSTRUAL MIGRAINE WITHOUT STATUS MIGRAINOSUS, NOT INTRACTABLE: ICD-10-CM

## 2024-06-03 DIAGNOSIS — E78.2 MIXED HYPERLIPIDEMIA: ICD-10-CM

## 2024-06-03 PROCEDURE — 3074F SYST BP LT 130 MM HG: CPT | Mod: CPTII,,, | Performed by: FAMILY MEDICINE

## 2024-06-03 PROCEDURE — 3008F BODY MASS INDEX DOCD: CPT | Mod: CPTII,,, | Performed by: FAMILY MEDICINE

## 2024-06-03 PROCEDURE — 1160F RVW MEDS BY RX/DR IN RCRD: CPT | Mod: CPTII,,, | Performed by: FAMILY MEDICINE

## 2024-06-03 PROCEDURE — 4010F ACE/ARB THERAPY RXD/TAKEN: CPT | Mod: CPTII,,, | Performed by: FAMILY MEDICINE

## 2024-06-03 PROCEDURE — 3079F DIAST BP 80-89 MM HG: CPT | Mod: CPTII,,, | Performed by: FAMILY MEDICINE

## 2024-06-03 PROCEDURE — 99214 OFFICE O/P EST MOD 30 MIN: CPT | Mod: ,,, | Performed by: FAMILY MEDICINE

## 2024-06-03 PROCEDURE — 1159F MED LIST DOCD IN RCRD: CPT | Mod: CPTII,,, | Performed by: FAMILY MEDICINE

## 2024-06-03 RX ORDER — PANTOPRAZOLE SODIUM 40 MG/1
40 TABLET, DELAYED RELEASE ORAL DAILY
Qty: 90 TABLET | Refills: 0 | Status: SHIPPED | OUTPATIENT
Start: 2024-06-03

## 2024-06-03 RX ORDER — BUSPIRONE HYDROCHLORIDE 5 MG/1
5 TABLET ORAL 2 TIMES DAILY PRN
Qty: 60 TABLET | Refills: 2 | Status: SHIPPED | OUTPATIENT
Start: 2024-06-03 | End: 2024-09-01

## 2024-06-03 RX ORDER — FLUTICASONE PROPIONATE 50 MCG
1 SPRAY, SUSPENSION (ML) NASAL DAILY
COMMUNITY

## 2024-06-03 RX ORDER — LOSARTAN POTASSIUM 100 MG/1
100 TABLET ORAL DAILY
Qty: 90 TABLET | Refills: 3 | Status: SHIPPED | OUTPATIENT
Start: 2024-06-03 | End: 2025-06-03

## 2024-06-03 RX ORDER — PRAVASTATIN SODIUM 40 MG/1
40 TABLET ORAL DAILY
Qty: 90 TABLET | Refills: 3 | Status: SHIPPED | OUTPATIENT
Start: 2024-06-03 | End: 2025-06-03

## 2024-06-03 RX ORDER — TIRZEPATIDE 2.5 MG/.5ML
2.5 INJECTION, SOLUTION SUBCUTANEOUS
Qty: 4 PEN | Refills: 1 | Status: SHIPPED | OUTPATIENT
Start: 2024-06-03 | End: 2024-08-02

## 2024-06-03 NOTE — PATIENT INSTRUCTIONS
Lj Mendoza,     If you are due for any health screening(s) below please notify me so we can arrange them to be ordered and scheduled. Most healthy patients at your age complete them, but you are free to accept or refuse.     If you can't do it, I'll definitely understand. If you can, I'd certainly appreciate it!    All of your core healthy metrics are met.

## 2024-06-03 NOTE — PROGRESS NOTES
Patient ID: [id number]     Chief Complaint: Establish Care        HPI:     Kelly Levin is a 54 y.o. female here today to establish care with a new MD, she had wellness done with previous PCP on 06/19/2023.   - Has been having problems with anxiety for the last few years seems to be getting worse over time.  No recent acute stressors.  Symptoms include  feeling nervous, anxious, on edge, not being able to stop control worrying, trouble relaxing, mind racing at night, becoming easily annoyed or irritable -which then leads to sadness, tearfulness-being overwhelmed.  Stressors and coping mechanisms discussed at length. She has tried Lexapro with drowsiness and she stopped taking the Lexapro several months ago. She denies depression, SI/HI, or AH/VH. She is not interested in outpatient counseling.   -  Hypertension: Patient has been taking medicine daily. BP is normal at home. No symptoms associated with increased BP such as headache/ visual changes/ dizziness/ chest pain.  She needs Rx refill of Losartan today.   -  Hyperlipidemia: Patient is taking medication at Night. Trying to follow modify diet. Increase activity. No side effects of medication noted. She needs Rx refill of Pravachol today  -  Reflux: Patient is continuing to take medication-no symptoms associated with reflux.  No noticeable side effects of medication. She needs a Rx refill of Protonix today.   - Migraine headaches:  Frequency/intensity have been controlled with use of medication. She has Rx Maxalt at home.  - She is obese, would like Rx to help with weight loss. She denies family history of MEN II or medullary thyroid cancer or personal history of pancreatitis. She is menopausal and she is not trying to get pregnant. She is not interested in seeing a dietician.   - She is not interested in HIV and Hep C screening.   Patient is without any other complaints today.         -------------------------------------    Essential hypertension     Gastric reflux    Menstrual migraine without status migrainosus, not intractable    Mixed hyperlipidemia    Seasonal allergies        Past Surgical History:   Procedure Laterality Date    COLONOSCOPY  04/20/2021    REDUCTION OF BOTH BREASTS Bilateral 02/22/2022       Review of patient's allergies indicates:   Allergen Reactions    Ace inhibitors      Other reaction(s): Cough    Amlodipine Edema       Outpatient Medications Marked as Taking for the 6/3/24 encounter (Office Visit) with Gladys Hernandez MD   Medication Sig Dispense Refill    fluticasone propionate (FLONASE) 50 mcg/actuation nasal spray 1 spray by Each Nostril route once daily.      loratadine (CLARITIN) 10 mg tablet Take 1 tablet (10 mg total) by mouth once daily. 90 tablet 3    rizatriptan (MAXALT-MLT) 10 MG disintegrating tablet Take 10 mg by mouth.      [DISCONTINUED] losartan (COZAAR) 100 MG tablet Take 1 tablet by mouth once daily 90 tablet 0    [DISCONTINUED] pantoprazole (PROTONIX) 40 MG tablet Take 1 tablet by mouth once daily 90 tablet 0    [DISCONTINUED] pravastatin (PRAVACHOL) 40 MG tablet Take 1 tablet (40 mg total) by mouth once daily. 90 tablet 3       Social History     Socioeconomic History    Marital status: Single    Number of children: 3   Occupational History    Occupation: [employer]: [employer]   Tobacco Use    Smoking status: Never    Smokeless tobacco: Never   Substance and Sexual Activity    Alcohol use: Not Currently    Drug use: Never    Sexual activity: Not Currently        Family History   Problem Relation Name Age of Onset    Hypertension Mother      Stroke Father          Subjective:       Review of Systems:    See HPI for details    Constitutional: Denies Change in appetite. Denies Chills. Denies Fever. Denies Night sweats.  Eye: Denies Blurred vision. Denies Discharge. Denies Eye pain.  ENT: Denies Decreased hearing. Denies Sore throat. Denies Swollen glands.  Respiratory: Denies Cough. Denies Shortness of breath.  "Denies Shortness of breath with exertion. Denies Wheezing.  Cardiovascular: Denies Chest pain at rest. Denies Chest pain with exertion. Denies Irregular heartbeat. Denies Palpitations.  Gastrointestinal: Denies Abdominal pain. Denies Diarrhea. Denies Nausea. Denies Vomiting. Denies Hematemesis or Hematochezia.  Genitourinary: Denies Dysuria. Denies Urinary frequency. Denies Urinary urgency. Denies Blood in urine.  Endocrine: Denies Cold intolerance. Denies Excessive thirst. Denies Heat intolerance. Denies Weight loss. Denies Weight gain.  Musculoskeletal: Denies Painful joints. Denies Weakness.  Integumentary: Denies Rash. Denies Itching. Denies Dry skin.  Neurologic: Denies Dizziness. Denies Fainting. Denies Headache.  Psychiatric: Denies Depression. Reports Anxiety. Denies Suicidal/Homicidal ideations.    All Other ROS: Negative except as stated in HPI.       Objective:     /89 (BP Location: Right arm, Patient Position: Sitting, BP Method: Large (Automatic))   Pulse 79   Resp 16   Ht 5' 4" (1.626 m)   Wt 83.5 kg (184 lb)   SpO2 98%   BMI 31.58 kg/m²     Physical Exam    General: Alert and oriented, No acute distress. Obese.   Head: Normocephalic, Atraumatic.  Eye: Pupils are equal, round and reactive to light, Extraocular movements are intact, Sclera non-icteric.  Ears/Nose/Throat: Normal, Mucosa moist,Clear.  Neck/Thyroid: Supple, Non-tender, No carotid bruit, No palpable thyromegaly or thyroid nodule, No lymphadenopathy, No JVD, Full range of motion.  Respiratory: Clear to auscultation bilaterally; No wheezes, rales or rhonchi,Non-labored respirations, Symmetrical chest wall expansion.  Cardiovascular: Regular rate and rhythm, S1/S2 normal, No murmurs, rubs or gallops.  Gastrointestinal: Soft, Non-tender, Non-distended, Normal bowel sounds, No palpable organomegaly.  Musculoskeletal: Normal range of motion.  Integumentary: Warm, Dry, Intact, No suspicious lesions or rashes.  Extremities: No clubbing, " cyanosis or edema  Neurologic: No focal deficits, Cranial Nerves II-XII are grossly intact, Motor strength normal upper and lower extremities, Sensory exam intact.  Psychiatric: Normal interaction, Coherent speech, Euthymic mood, Appropriate affect         Assessment:       ICD-10-CM ICD-9-CM   1. Generalized anxiety disorder  F41.1 300.02   2. Essential hypertension  I10 401.9   3. Mixed hyperlipidemia  E78.2 272.2   4. Gastric reflux  K21.9 530.81   5. Menstrual migraine without status migrainosus, not intractable  G43.829 346.40   6. Class 1 obesity due to excess calories with serious comorbidity and body mass index (BMI) of 31.0 to 31.9 in adult  E66.09 278.00    Z68.31 V85.31        Plan:     Problem List Items Addressed This Visit          Neuro    Menstrual migraine without status migrainosus, not intractable       Psychiatric    Generalized anxiety disorder - Primary    Relevant Medications    busPIRone (BUSPAR) 5 MG Tab       Cardiac/Vascular    Essential hypertension    Relevant Medications    losartan (COZAAR) 100 MG tablet    Mixed hyperlipidemia    Relevant Medications    pravastatin (PRAVACHOL) 40 MG tablet       GI    Gastric reflux    Relevant Medications    pantoprazole (PROTONIX) 40 MG tablet     Other Visit Diagnoses       Class 1 obesity due to excess calories with serious comorbidity and body mass index (BMI) of 31.0 to 31.9 in adult        Relevant Medications    tirzepatide, weight loss, (ZEPBOUND) 2.5 mg/0.5 mL PnIj         1. Generalized anxiety disorder  - Rx trial of busPIRone (BUSPAR) 5 MG Tab; Take 1 tablet (5 mg total) by mouth 2 (two) times daily as needed (anxiety).  Dispense: 60 tablet; Refill: 2  - Continue relaxation techniques. Will titrate medication as needed/tolerated. Notify M.D. or ER if symptoms persist or worsen, SI/HI, temp greater than 100.4, or any acute illness.    Start   /  Continue   Practice deep breathing or abdominal breathing exercises when anxiety  occurs.  Exercise daily. Get sunlight daily.  Avoid caffeine, alcohol and stimulants.  Practice positive phrases and repeat throughout the day, yoga, lavender scents or Chamomile tea will help anxiety.  Set healthy boundaries, avoid people and conversations that increase stress.  Reports any symptoms of suicidal or homicidal ideations immediately, if clinic is closed go to nearest emergency room.     2. Essential hypertension  - Rx losartan (COZAAR) 100 MG tablet; Take 1 tablet (100 mg total) by mouth once daily.  Dispense: 90 tablet; Refill: 3 refilled today.   - BP is well controlled. Continue Cozaar as prescribed. Keep daily BP log. Notify M.D. or ER if BP >170/100 or <90/60, chest pain, palpitations, headache, SOB, temp greater than 100.4, or any acute illness.   Continue  Low Sodium Diet (DASH Diet - Less than 2 grams of sodium per day).  Monitor blood pressure daily and log. Report consistent numbers greater than 140/90.  Smoking cessation encouraged to aid in BP reduction.  Maintain healthy weight with goal BMI <30. Exercise 30 minutes per day, 5 days per week.      3. Mixed hyperlipidemia  - Rx pravastatin (PRAVACHOL) 40 MG tablet; Take 1 tablet (40 mg total) by mouth once daily.  Dispense: 90 tablet; Refill: 3 refilled today.  - Continue Pravastatin as prescribed, will titrate Rx pending lab results.   Continue  Stressed importance of dietary modifications. Follow a low cholesterol, low saturated fat diet with less that 200mg of cholesterol a day.  Avoid fried foods and high saturated fats (high saturated fats less than 7% of calories).  Add Flax Seed/Fish Oil supplements to diet. Increase dietary fiber.  Regular exercise can reduce LDL and raise HDL. Stressed importance of physical activity 5 times per week for 30 minutes per day.      4. Gastric reflux  - Continue Rx Protonix; Rx pantoprazole (PROTONIX) 40 MG tablet; Take 1 tablet (40 mg total) by mouth once daily.  Dispense: 90 tablet; Refill: 0 refilled  today; GERD dietary precautions encouraged. Notify M.D. or ER if symptoms persist or worsen, abdominal pain vomiting, bloody stools, temp >100.4, or any acute illness.      5. Menstrual migraine without status migrainosus, not intractable  - Continue Maxalt p.r.n. Notify M.D. or ER if symptoms persist or worsen, headache refractory to treatment, temp >100.4, or any acute illness.      6. Class 1 obesity due to excess calories with serious comorbidity and body mass index (BMI) of 31.0 to 31.9 in adult  - Rx trial of tirzepatide, weight loss, (ZEPBOUND) 2.5 mg/0.5 mL PnIj; Inject 2.5 mg into the skin every 7 days.  Dispense: 4 Pen; Refill: 1  - Diet. exercise, and 10% weight loss encouraged. Rx trial of Zepbound with close monitoring to help with obesity. She agrees to avoid pregnancy with Zepbound. Will titrate Rx Zepbound as needed/tolerated until max dose achieved. Notify M.D. or ER if symptoms persist or worsen, adverse Rx side effects, pancreatitis, temp greater than 100.4, or any acute illness.    Body mass index is 31.58 kg/m².  Goal BMI <30.  Exercise 5 times a week for 30 minutes per day.  Avoid soda, simple sugars, excessive rice, potatoes or bread. Limit fast foods and fried foods.  Choose complex carbs in moderation (example: green vegetables, beans, oatmeal). Eat plenty of fresh fruits and vegetables with lean meats daily.  Do not skip meals. Eat a balanced portion size.  Avoid fad diets. Consider permanent healthy life style changes.        Kelly was seen today for establish care.    Diagnoses and all orders for this visit:    Generalized anxiety disorder  -     busPIRone (BUSPAR) 5 MG Tab; Take 1 tablet (5 mg total) by mouth 2 (two) times daily as needed (anxiety).    Essential hypertension  -     losartan (COZAAR) 100 MG tablet; Take 1 tablet (100 mg total) by mouth once daily.    Mixed hyperlipidemia  -     pravastatin (PRAVACHOL) 40 MG tablet; Take 1 tablet (40 mg total) by mouth once  daily.    Gastric reflux  -     pantoprazole (PROTONIX) 40 MG tablet; Take 1 tablet (40 mg total) by mouth once daily.    Menstrual migraine without status migrainosus, not intractable    Class 1 obesity due to excess calories with serious comorbidity and body mass index (BMI) of 31.0 to 31.9 in adult  -     tirzepatide, weight loss, (ZEPBOUND) 2.5 mg/0.5 mL PnIj; Inject 2.5 mg into the skin every 7 days.          Medication List with Changes/Refills   New Medications    BUSPIRONE (BUSPAR) 5 MG TAB    Take 1 tablet (5 mg total) by mouth 2 (two) times daily as needed (anxiety).       Start Date: 6/3/2024  End Date: 9/1/2024    TIRZEPATIDE, WEIGHT LOSS, (ZEPBOUND) 2.5 MG/0.5 ML PNIJ    Inject 2.5 mg into the skin every 7 days.       Start Date: 6/3/2024  End Date: 8/2/2024   Current Medications    FLUTICASONE PROPIONATE (FLONASE) 50 MCG/ACTUATION NASAL SPRAY    1 spray by Each Nostril route once daily.       Start Date: --        End Date: --    LORATADINE (CLARITIN) 10 MG TABLET    Take 1 tablet (10 mg total) by mouth once daily.       Start Date: 5/15/2023 End Date: --    RIZATRIPTAN (MAXALT-MLT) 10 MG DISINTEGRATING TABLET    Take 10 mg by mouth.       Start Date: 3/10/2023 End Date: --   Changed and/or Refilled Medications    Modified Medication Previous Medication    LOSARTAN (COZAAR) 100 MG TABLET losartan (COZAAR) 100 MG tablet       Take 1 tablet (100 mg total) by mouth once daily.    Take 1 tablet by mouth once daily       Start Date: 6/3/2024  End Date: 6/3/2025    Start Date: 4/15/2024 End Date: 6/3/2024    PANTOPRAZOLE (PROTONIX) 40 MG TABLET pantoprazole (PROTONIX) 40 MG tablet       Take 1 tablet (40 mg total) by mouth once daily.    Take 1 tablet by mouth once daily       Start Date: 6/3/2024  End Date: --    Start Date: 2/22/2024 End Date: 6/3/2024    PRAVASTATIN (PRAVACHOL) 40 MG TABLET pravastatin (PRAVACHOL) 40 MG tablet       Take 1 tablet (40 mg total) by mouth once daily.    Take 1 tablet (40 mg  total) by mouth once daily.       Start Date: 6/3/2024  End Date: 6/3/2025    Start Date: 6/27/2023 End Date: 6/3/2024   Discontinued Medications    ESCITALOPRAM OXALATE (LEXAPRO) 5 MG TAB    Take 1 tablet by mouth once daily       Start Date: 1/31/2024 End Date: 6/3/2024          Follow up in about 4 weeks (around 7/1/2024) for Wellness, Anxiety followup, Obesity Followup.

## 2024-06-17 DIAGNOSIS — J30.2 SEASONAL ALLERGIES: ICD-10-CM

## 2024-06-17 RX ORDER — LORATADINE 10 MG/1
10 TABLET ORAL DAILY
Qty: 90 TABLET | Refills: 3 | Status: SHIPPED | OUTPATIENT
Start: 2024-06-17

## 2024-07-08 ENCOUNTER — OFFICE VISIT (OUTPATIENT)
Dept: FAMILY MEDICINE | Facility: CLINIC | Age: 54
End: 2024-07-08
Payer: COMMERCIAL

## 2024-07-08 VITALS
BODY MASS INDEX: 32.95 KG/M2 | SYSTOLIC BLOOD PRESSURE: 125 MMHG | WEIGHT: 193 LBS | DIASTOLIC BLOOD PRESSURE: 84 MMHG | HEIGHT: 64 IN | OXYGEN SATURATION: 99 % | HEART RATE: 73 BPM

## 2024-07-08 DIAGNOSIS — F41.1 GENERALIZED ANXIETY DISORDER: ICD-10-CM

## 2024-07-08 DIAGNOSIS — K21.9 GASTRIC REFLUX: ICD-10-CM

## 2024-07-08 DIAGNOSIS — E78.2 MIXED HYPERLIPIDEMIA: ICD-10-CM

## 2024-07-08 DIAGNOSIS — Z00.00 WELLNESS EXAMINATION: Primary | ICD-10-CM

## 2024-07-08 DIAGNOSIS — I10 ESSENTIAL HYPERTENSION: ICD-10-CM

## 2024-07-08 DIAGNOSIS — G43.829 MENSTRUAL MIGRAINE WITHOUT STATUS MIGRAINOSUS, NOT INTRACTABLE: ICD-10-CM

## 2024-07-08 DIAGNOSIS — E66.09 CLASS 1 OBESITY DUE TO EXCESS CALORIES WITH SERIOUS COMORBIDITY AND BODY MASS INDEX (BMI) OF 33.0 TO 33.9 IN ADULT: ICD-10-CM

## 2024-07-08 PROBLEM — E66.811 CLASS 1 OBESITY DUE TO EXCESS CALORIES WITH SERIOUS COMORBIDITY AND BODY MASS INDEX (BMI) OF 31.0 TO 31.9 IN ADULT: Status: ACTIVE | Noted: 2024-07-08

## 2024-07-08 PROBLEM — G43.009 NONINTRACTABLE MIGRAINE: Status: ACTIVE | Noted: 2024-07-08

## 2024-07-08 PROBLEM — E66.811 CLASS 1 OBESITY DUE TO EXCESS CALORIES WITH SERIOUS COMORBIDITY AND BODY MASS INDEX (BMI) OF 33.0 TO 33.9 IN ADULT: Status: ACTIVE | Noted: 2024-07-08

## 2024-07-08 PROCEDURE — 1159F MED LIST DOCD IN RCRD: CPT | Mod: CPTII,,,

## 2024-07-08 PROCEDURE — 99396 PREV VISIT EST AGE 40-64: CPT | Mod: ,,,

## 2024-07-08 PROCEDURE — 3008F BODY MASS INDEX DOCD: CPT | Mod: CPTII,,,

## 2024-07-08 PROCEDURE — 1160F RVW MEDS BY RX/DR IN RCRD: CPT | Mod: CPTII,,,

## 2024-07-08 PROCEDURE — 3079F DIAST BP 80-89 MM HG: CPT | Mod: CPTII,,,

## 2024-07-08 PROCEDURE — 3044F HG A1C LEVEL LT 7.0%: CPT | Mod: CPTII,,,

## 2024-07-08 PROCEDURE — 4010F ACE/ARB THERAPY RXD/TAKEN: CPT | Mod: CPTII,,,

## 2024-07-08 PROCEDURE — 3074F SYST BP LT 130 MM HG: CPT | Mod: CPTII,,,

## 2024-07-08 RX ORDER — BUSPIRONE HYDROCHLORIDE 10 MG/1
10 TABLET ORAL 2 TIMES DAILY
Qty: 60 TABLET | Refills: 11 | Status: SHIPPED | OUTPATIENT
Start: 2024-07-08 | End: 2025-07-08

## 2024-07-09 NOTE — ASSESSMENT & PLAN NOTE
Monthly breast self exam recommended.  Diet, exercise and 10% weight loss encouraged.  Continue annual PAP and MMG annually.  Recommend biannual dental exam.

## 2024-07-09 NOTE — ASSESSMENT & PLAN NOTE
Body mass index is 33.13 kg/m².  Goal BMI <30.  Exercise 5 times a week for 30 minutes per day. Consider incorporating kettle bell weights as tolerated.   Avoid soda, simple sugars, excessive rice, potatoes or bread. Limit fast foods and fried foods.  Choose complex carbs in moderation (example: green vegetables, beans, oatmeal). Eat plenty of fresh fruits and vegetables with lean meats daily.  Do not skip meals. Prepare more meals at home. Eat a balanced portion size.  Avoid fad diets. Consider permanent healthy life style changes.

## 2024-07-09 NOTE — ASSESSMENT & PLAN NOTE
BP is at goal. Continue BP medications as prescribed. Will titrate BP Rx until BP is <140/90   Monitor blood pressure daily and log. Report consistent numbers greater than 138/88.  Stressed low sodium diet (DASH Diet - Less than 2 grams of sodium per day).  Goal BMI <30. Exercise 30 minutes per day, 5 days per week.   Notify M.D. or ER if BP >170/100 or <90/60, chest pain, palpitations, headache, SOB, temp greater than 100.4, or any acute illness.

## 2024-07-09 NOTE — ASSESSMENT & PLAN NOTE
Rx trail of dose increase buspirone 10 mg twice a day.Notify Provider of medication side effects.   Stressed incorporating relaxation techniques and coping strategies such as deep breathing, exercise, meditation, etc.  Follow up in 3-4 weeks.  Seek immediate medical treatment for SOB, persistent panic attack, chest pain, suicidal thoughts or hallucinations.

## 2024-07-09 NOTE — ASSESSMENT & PLAN NOTE
Continue pravastatin as prescribed every evening.  Notify the provider if you experience abdominal pain, muscle aches or cramps.  Stressed following a low cholesterol, low fat diet. Avoid fried foods and high saturated fats.  Increase fiber intake. Foods high in soluble fiber, such as oats, beans, lentils, fruits, and vegetables, can help lower LDL cholesterol levels.   Exercise/walk 5 times per week for 30 minutes a day. Regular physical activity can help raise HDL (high-density lipoprotein) cholesterol and lower LDL cholesterol.

## 2024-07-09 NOTE — ASSESSMENT & PLAN NOTE
Stable. Continue maxalt as needed. Present to ED if if headache worsen or does not response to treatment.

## 2024-07-09 NOTE — ASSESSMENT & PLAN NOTE
Stable. Continue pantoprazole 40 mg daily.  Avoid spicy, acidic, fried foods and alcohol.  Eat 2-3 hours before going to bed.  Avoid tight clothing, chew food thoroughly.  Reduce caffeine intake, avoid soda.

## 2024-07-17 ENCOUNTER — TELEPHONE (OUTPATIENT)
Dept: FAMILY MEDICINE | Facility: CLINIC | Age: 54
End: 2024-07-17
Payer: COMMERCIAL

## 2024-07-17 DIAGNOSIS — E66.09 CLASS 1 OBESITY DUE TO EXCESS CALORIES WITH SERIOUS COMORBIDITY AND BODY MASS INDEX (BMI) OF 33.0 TO 33.9 IN ADULT: Primary | ICD-10-CM

## 2024-07-17 RX ORDER — PHENTERMINE HYDROCHLORIDE 37.5 MG/1
18.75 TABLET ORAL
Qty: 30 TABLET | Refills: 0 | Status: SHIPPED | OUTPATIENT
Start: 2024-07-17 | End: 2024-08-16

## 2024-07-17 RX ORDER — SEMAGLUTIDE 0.25 MG/.5ML
0.25 INJECTION, SOLUTION SUBCUTANEOUS
Qty: 2 ML | Refills: 1 | Status: SHIPPED | OUTPATIENT
Start: 2024-07-17 | End: 2024-07-17

## 2024-07-17 NOTE — TELEPHONE ENCOUNTER
Pt is looking to get on Adipex. She was just here for her wellness on 07/08/24. Pt tried to get Zepbound but insurance denied. I sent her an EVISIT. She voiced she will get it done.

## 2024-07-17 NOTE — TELEPHONE ENCOUNTER
----- Message from Oscar Barr sent at 7/17/2024  2:13 PM CDT -----  .Type:  Needs Medical Advice    Who Called: Patient  Symptoms (please be specific):    How long has patient had these symptoms:    Pharmacy name and phone #:    Would the patient rather a call back or a response via [facility]?   Best Call Back Number: [phone]  Additional Information: Patient called requested to speak with Ms. Betty about her medication.

## 2024-07-17 NOTE — TELEPHONE ENCOUNTER
Called pt back and she voiced Wegovy is not covered by insurance she would like to start her trail of Adipex, thanks!

## 2024-08-19 ENCOUNTER — OFFICE VISIT (OUTPATIENT)
Dept: FAMILY MEDICINE | Facility: CLINIC | Age: 54
End: 2024-08-19
Payer: COMMERCIAL

## 2024-08-19 VITALS
HEIGHT: 64 IN | HEART RATE: 76 BPM | SYSTOLIC BLOOD PRESSURE: 114 MMHG | WEIGHT: 182.19 LBS | BODY MASS INDEX: 31.1 KG/M2 | DIASTOLIC BLOOD PRESSURE: 82 MMHG | OXYGEN SATURATION: 99 %

## 2024-08-19 DIAGNOSIS — F32.A ANXIETY AND DEPRESSION: Primary | ICD-10-CM

## 2024-08-19 DIAGNOSIS — E66.09 CLASS 1 OBESITY DUE TO EXCESS CALORIES WITH SERIOUS COMORBIDITY AND BODY MASS INDEX (BMI) OF 31.0 TO 31.9 IN ADULT: ICD-10-CM

## 2024-08-19 DIAGNOSIS — F41.9 ANXIETY AND DEPRESSION: Primary | ICD-10-CM

## 2024-08-19 PROCEDURE — 3074F SYST BP LT 130 MM HG: CPT | Mod: CPTII,,,

## 2024-08-19 PROCEDURE — 1159F MED LIST DOCD IN RCRD: CPT | Mod: CPTII,,,

## 2024-08-19 PROCEDURE — 3008F BODY MASS INDEX DOCD: CPT | Mod: CPTII,,,

## 2024-08-19 PROCEDURE — 1160F RVW MEDS BY RX/DR IN RCRD: CPT | Mod: CPTII,,,

## 2024-08-19 PROCEDURE — 3079F DIAST BP 80-89 MM HG: CPT | Mod: CPTII,,,

## 2024-08-19 PROCEDURE — 3044F HG A1C LEVEL LT 7.0%: CPT | Mod: CPTII,,,

## 2024-08-19 PROCEDURE — 4010F ACE/ARB THERAPY RXD/TAKEN: CPT | Mod: CPTII,,,

## 2024-08-19 PROCEDURE — 99213 OFFICE O/P EST LOW 20 MIN: CPT | Mod: ,,,

## 2024-08-19 PROCEDURE — G2211 COMPLEX E/M VISIT ADD ON: HCPCS | Mod: ,,,

## 2024-08-19 RX ORDER — PHENTERMINE HYDROCHLORIDE 37.5 MG/1
37.5 TABLET ORAL
COMMUNITY

## 2024-08-19 RX ORDER — BUSPIRONE HYDROCHLORIDE 5 MG/1
5 TABLET ORAL 2 TIMES DAILY
Qty: 60 TABLET | Refills: 2 | Status: SHIPPED | OUTPATIENT
Start: 2024-08-19 | End: 2024-11-17

## 2024-08-19 NOTE — PROGRESS NOTES
"  Patient ID: [id number]     Chief Complaint: Medication Refill    HPI:     Kelly Levin is a 54 y.o. female here today for a medication refill. She has Rx for phentermine 37.5 mg for weight loss. Patient reports she is taking a 1/2 tab (18.75 mg) twice a day with meals. Patient reports an 11 lbs weight lost since her last visit. She is eating 2-3 meals a day. She is exercising two times per week. She is tolerating the medication well and denies medication side effects. Denies palpitations, anorexia, dizziness or insomnia. She is requesting a refill of phentermine. Advised patient NP cannot rx weight loss medications. She must be evaluated face to face with the Medical Doctor.      Patient reports anxiety is controlled. She reports medication compliance daily and denies missing a dose. She denies feeling nervous or  irritable.  Medication dose (busPIRone) was increased to 10 mg BID, at last visit. Patient states "I don't like the way the medication makes me feel." "I feel like I am not myself." She reports she is taking 5 mg twice a day without sides effects. She would like to adjust her RX to reflect this current dose.      Past Medical History:   Diagnosis Date    Essential hypertension 04/27/2023    Gastric reflux 04/27/2023    Menstrual migraine without status migrainosus, not intractable 04/27/2023    Mixed hyperlipidemia 04/27/2023    Seasonal allergies 04/27/2023        Past Surgical History:   Procedure Laterality Date    COLONOSCOPY  04/20/2021    REDUCTION OF BOTH BREASTS Bilateral 02/22/2022        Social History     Tobacco Use    Smoking status: Never    Smokeless tobacco: Never   Substance and Sexual Activity    Alcohol use: Not Currently    Drug use: Never    Sexual activity: Not Currently        Current Outpatient Medications   Medication Instructions    busPIRone (BUSPAR) 5 mg, Oral, 2 times daily    fluticasone propionate (FLONASE) 50 mcg/actuation nasal spray 1 spray, Each Nostril, Daily    " "loratadine (CLARITIN) 10 mg, Oral, Daily    losartan (COZAAR) 100 mg, Oral, Daily    pantoprazole (PROTONIX) 40 mg, Oral, Daily    phentermine (ADIPEX-P) 37.5 mg, Oral, Before breakfast    pravastatin (PRAVACHOL) 40 mg, Oral, Daily    rizatriptan (MAXALT-MLT) 10 mg, Oral       Review of patient's allergies indicates:   Allergen Reactions    Ace inhibitors      Other reaction(s): Cough    Amlodipine Edema        Patient Care Team:  Gladys Hernandez MD as PCP - General (Family Medicine)  Tammie Dawn MD as Consulting Physician (Obstetrics and Gynecology)  JUDITH Bell MD as Consulting Physician (Gastroenterology)  Raul Will Jr., MD as Consulting Physician (Otolaryngology)     Subjective:     Review of Systems   Constitutional: Negative.    HENT: Negative.     Eyes: Negative.    Respiratory:  Negative for cough, chest tightness and shortness of breath.    Cardiovascular:  Negative for chest pain, palpitations and leg swelling.   Gastrointestinal:  Negative for abdominal pain, constipation, diarrhea, nausea and vomiting.   Endocrine: Negative for cold intolerance, heat intolerance, polydipsia, polyphagia and polyuria.   Genitourinary:  Negative for dysuria, flank pain, frequency, urgency and vaginal pain.   Musculoskeletal: Negative.    Skin: Negative.    Allergic/Immunologic: Negative.    Neurological: Negative.    Hematological: Negative.    Psychiatric/Behavioral:  Negative for agitation, self-injury, sleep disturbance and suicidal ideas. The patient is not nervous/anxious.      12 point review of systems conducted, negative except as stated in the history of present illness. See HPI for details.    Objective:     Visit Vitals  /82 (BP Location: Left arm, Patient Position: Sitting, BP Method: Large (Automatic))   Pulse 76   Ht 5' 4" (1.626 m)   Wt 82.6 kg (182 lb 3.2 oz)   SpO2 99%   BMI 31.27 kg/m²       Physical Exam  Vitals reviewed.   Constitutional:       General: She is not in " acute distress.     Appearance: Normal appearance.   HENT:      Mouth/Throat:      Mouth: Mucous membranes are moist.      Pharynx: Oropharynx is clear.   Eyes:      Conjunctiva/sclera: Conjunctivae normal.      Pupils: Pupils are equal, round, and reactive to light.   Cardiovascular:      Rate and Rhythm: Normal rate and regular rhythm.      Pulses: Normal pulses.      Heart sounds: Normal heart sounds.   Pulmonary:      Effort: Pulmonary effort is normal.      Breath sounds: Normal breath sounds.   Abdominal:      General: Bowel sounds are normal.   Musculoskeletal:         General: Normal range of motion.      Cervical back: Normal range of motion.      Right lower leg: No edema.      Left lower leg: No edema.   Skin:     General: Skin is warm and dry.      Capillary Refill: Capillary refill takes less than 2 seconds.   Neurological:      General: No focal deficit present.      Mental Status: She is alert and oriented to person, place, and time.   Psychiatric:         Mood and Affect: Mood normal.         Behavior: Behavior normal.         Thought Content: Thought content normal.         Judgment: Judgment normal.       Assessment:       ICD-10-CM ICD-9-CM   1. Anxiety and depression  F41.9 300.00    F32.A 311   2. Class 1 obesity due to excess calories with serious comorbidity and body mass index (BMI) of 31.0 to 31.9 in adult  E66.09 278.00    Z68.31 V85.31      Plan:     1. Anxiety and depression  Assessment & Plan:  Stable. Dose decrease of buspar to 5 mg twice a day. Will titrate dose as tolerated and for symptom resolution.   Continue relaxation techniques of positive affirmations, deep breathing and exercise.  Seek immediate medical treatment for SOB, persistent panic attack, chest pain, suicidal thoughts or hallucinations.      Orders:  -     busPIRone (BUSPAR) 5 MG Tab; Take 1 tablet (5 mg total) by mouth 2 (two) times daily.  Dispense: 60 tablet; Refill: 2    2. Class 1 obesity due to excess calories  with serious comorbidity and body mass index (BMI) of 31.0 to 31.9 in adult  Assessment & Plan:  Body mass index is 31.27 kg/m².  Goal BMI <30.  Increase exercise to 5 times a week for 30 minutes per day.  Consider prepping meals as discussed.  Schedule appointment with PCP to discuss weight loss medication management.  Avoid soda, simple sugars, excessive rice, potatoes or bread. Limit fast foods and fried foods.  Choose complex carbs in moderation (example: green vegetables, beans, oatmeal). Eat plenty of fresh fruits and vegetables with lean meats daily.  Do not skip meals. Eat a balanced portion size.  Avoid fad diets. Consider permanent healthy life style changes.              Schedule annual wellness. In addition to their scheduled follow up, the patient has also been instructed to follow up on as needed basis.     Future Appointments   Date Time Provider Department Center   7/9/2025  8:30 AM [facility]        [place] Mahmood

## 2024-08-19 NOTE — ASSESSMENT & PLAN NOTE
Stable. Dose decrease of buspar to 5 mg twice a day. Will titrate dose as tolerated and for symptom resolution.   Continue relaxation techniques of positive affirmations, deep breathing and exercise.  Seek immediate medical treatment for SOB, persistent panic attack, chest pain, suicidal thoughts or hallucinations.

## 2024-08-19 NOTE — ASSESSMENT & PLAN NOTE
Body mass index is 31.27 kg/m².  Goal BMI <30.  Increase exercise to 5 times a week for 30 minutes per day.  Consider prepping meals as discussed.  Schedule appointment with PCP to discuss weight loss medication management.  Avoid soda, simple sugars, excessive rice, potatoes or bread. Limit fast foods and fried foods.  Choose complex carbs in moderation (example: green vegetables, beans, oatmeal). Eat plenty of fresh fruits and vegetables with lean meats daily.  Do not skip meals. Eat a balanced portion size.  Avoid fad diets. Consider permanent healthy life style changes.

## 2024-08-21 ENCOUNTER — TELEPHONE (OUTPATIENT)
Dept: FAMILY MEDICINE | Facility: CLINIC | Age: 54
End: 2024-08-21
Payer: COMMERCIAL

## 2024-08-21 NOTE — TELEPHONE ENCOUNTER
----- Message from Jeny Palomares sent at 8/21/2024  8:13 AM CDT -----  Regarding: med  .Type:  Needs Medical Advice    Who Called: pt  Symptoms (please be specific):    How long has patient had these symptoms:    Pharmacy name and phone #:    Would the patient rather a call back or a response via [facility]?   Best Call Back Number:  [phone]  Additional Information: requesting refill on weight loss med

## 2024-08-27 ENCOUNTER — OFFICE VISIT (OUTPATIENT)
Dept: FAMILY MEDICINE | Facility: CLINIC | Age: 54
End: 2024-08-27
Payer: COMMERCIAL

## 2024-08-27 VITALS
SYSTOLIC BLOOD PRESSURE: 120 MMHG | HEIGHT: 64 IN | BODY MASS INDEX: 31.92 KG/M2 | WEIGHT: 187 LBS | DIASTOLIC BLOOD PRESSURE: 82 MMHG | RESPIRATION RATE: 16 BRPM | HEART RATE: 89 BPM | OXYGEN SATURATION: 99 %

## 2024-08-27 DIAGNOSIS — E83.52 HYPERCALCEMIA: ICD-10-CM

## 2024-08-27 DIAGNOSIS — F41.1 GENERALIZED ANXIETY DISORDER: Primary | ICD-10-CM

## 2024-08-27 DIAGNOSIS — D70.9 NEUTROPENIA, UNSPECIFIED TYPE: ICD-10-CM

## 2024-08-27 DIAGNOSIS — R31.21 ASYMPTOMATIC MICROSCOPIC HEMATURIA: ICD-10-CM

## 2024-08-27 DIAGNOSIS — E66.09 CLASS 1 OBESITY DUE TO EXCESS CALORIES WITH SERIOUS COMORBIDITY AND BODY MASS INDEX (BMI) OF 32.0 TO 32.9 IN ADULT: ICD-10-CM

## 2024-08-27 DIAGNOSIS — Z12.31 VISIT FOR SCREENING MAMMOGRAM: ICD-10-CM

## 2024-08-27 PROCEDURE — 3074F SYST BP LT 130 MM HG: CPT | Mod: CPTII,,, | Performed by: FAMILY MEDICINE

## 2024-08-27 PROCEDURE — 4010F ACE/ARB THERAPY RXD/TAKEN: CPT | Mod: CPTII,,, | Performed by: FAMILY MEDICINE

## 2024-08-27 PROCEDURE — 3008F BODY MASS INDEX DOCD: CPT | Mod: CPTII,,, | Performed by: FAMILY MEDICINE

## 2024-08-27 PROCEDURE — 3044F HG A1C LEVEL LT 7.0%: CPT | Mod: CPTII,,, | Performed by: FAMILY MEDICINE

## 2024-08-27 PROCEDURE — 99214 OFFICE O/P EST MOD 30 MIN: CPT | Mod: ,,, | Performed by: FAMILY MEDICINE

## 2024-08-27 PROCEDURE — 3079F DIAST BP 80-89 MM HG: CPT | Mod: CPTII,,, | Performed by: FAMILY MEDICINE

## 2024-08-27 PROCEDURE — 1160F RVW MEDS BY RX/DR IN RCRD: CPT | Mod: CPTII,,, | Performed by: FAMILY MEDICINE

## 2024-08-27 PROCEDURE — 1159F MED LIST DOCD IN RCRD: CPT | Mod: CPTII,,, | Performed by: FAMILY MEDICINE

## 2024-08-27 RX ORDER — PHENTERMINE HYDROCHLORIDE 37.5 MG/1
18.75 TABLET ORAL 2 TIMES DAILY WITH MEALS
Qty: 30 TABLET | Refills: 0 | Status: SHIPPED | OUTPATIENT
Start: 2024-08-27 | End: 2024-09-26

## 2024-08-27 RX ORDER — BUSPIRONE HYDROCHLORIDE 7.5 MG/1
7.5 TABLET ORAL 2 TIMES DAILY
Qty: 60 TABLET | Refills: 2 | Status: SHIPPED | OUTPATIENT
Start: 2024-08-27 | End: 2024-11-25

## 2024-08-27 NOTE — PROGRESS NOTES
Patient ID: [id number]     Chief Complaint: Medication Refill and Anxiety        HPI:     Kelly Levin is a 54 y.o. female here today for a follow up anxiety.  - Has been having problems with anxiety for the last few years seems to be getting worse over time.  No recent acute stressors.  Symptoms include  feeling nervous, anxious, on edge, not being able to stop control worrying, trouble relaxing, mind racing at night, becoming easily annoyed or irritable -which then leads to sadness, tearfulness-being overwhelmed.  Stressors and coping mechanisms discussed at length. She has tried Lexapro with drowsiness and she stopped taking the Lexapro several months ago. She has been taking Buspirone 5mg x bid with some improvement of symptoms, but not at goal, has tried a 10mg in the past with side effects, never tried a 7.5mg, she still has 5mg tablets at home. She denies depression, SI/HI, or AH/VH. She is not interested in outpatient counseling.   - She is here for followup obesity, doing well with Adipex, lost 6 pounds since last visit with Adipex and lifestyle changes, no side effects, she needs Rx refill today.   - She needs MMG ordered at [facility].   - Patient is without any other complaints today.       -------------------------------------    Essential hypertension    Gastric reflux    Menstrual migraine without status migrainosus, not intractable    Mixed hyperlipidemia    Seasonal allergies        Past Surgical History:   Procedure Laterality Date    COLONOSCOPY  04/20/2021    REDUCTION OF BOTH BREASTS Bilateral 02/22/2022       Review of patient's allergies indicates:   Allergen Reactions    Ace inhibitors      Other reaction(s): Cough    Amlodipine Edema       Outpatient Medications Marked as Taking for the 8/27/24 encounter (Office Visit) with Gladys Hernandez MD   Medication Sig Dispense Refill    fluticasone propionate (FLONASE) 50 mcg/actuation nasal spray 1 spray by Each Nostril route once daily.       loratadine (CLARITIN) 10 mg tablet Take 1 tablet (10 mg total) by mouth once daily. 90 tablet 3    losartan (COZAAR) 100 MG tablet Take 1 tablet (100 mg total) by mouth once daily. 90 tablet 3    pantoprazole (PROTONIX) 40 MG tablet Take 1 tablet (40 mg total) by mouth once daily. 90 tablet 0    pravastatin (PRAVACHOL) 40 MG tablet Take 1 tablet (40 mg total) by mouth once daily. 90 tablet 3    rizatriptan (MAXALT-MLT) 10 MG disintegrating tablet Take 10 mg by mouth.      [DISCONTINUED] busPIRone (BUSPAR) 5 MG Tab Take 1 tablet (5 mg total) by mouth 2 (two) times daily. 60 tablet 2    [DISCONTINUED] phentermine (ADIPEX-P) 37.5 mg tablet Take 37.5 mg by mouth before breakfast.         Social History     Socioeconomic History    Marital status: Single    Number of children: 3   Occupational History    Occupation: [employer]:    Tobacco Use    Smoking status: Never    Smokeless tobacco: Never   Substance and Sexual Activity    Alcohol use: Not Currently    Drug use: Never    Sexual activity: Not Currently     Social Determinants of Health     Financial Resource Strain: Low Risk  (7/8/2024)    Overall Financial Resource Strain (CARDIA)     Difficulty of Paying Living Expenses: Not hard at all   Food Insecurity: No Food Insecurity (7/8/2024)    Hunger Vital Sign     Worried About Running Out of Food in the Last Year: Never true     Ran Out of Food in the Last Year: Never true   Transportation Needs: No Transportation Needs (7/8/2024)    TRANSPORTATION NEEDS     Transportation : No   Physical Activity: Insufficiently Active (7/8/2024)    Exercise Vital Sign     Days of Exercise per Week: 2 days     Minutes of Exercise per Session: 30 min   Stress: Stress Concern Present (7/8/2024)    [place] [employer] - Occupational Stress Questionnaire     Feeling of Stress : To some extent   Housing Stability: Low Risk  (7/8/2024)    Housing Stability Vital Sign     Unable to Pay for Housing in the Last  "Year: No     Homeless in the Last Year: No        Family History   Problem Relation Name Age of Onset    Hypertension Mother      Stroke Father          Subjective:       Review of Systems:    See HPI for details    Constitutional: Denies Change in appetite. Denies Chills. Denies Fever. Denies Night sweats.  Eye: Denies Blurred vision. Denies Discharge. Denies Eye pain.  ENT: Denies Decreased hearing. Denies Sore throat. Denies Swollen glands.  Respiratory: Denies Cough. Denies Shortness of breath. Denies Shortness of breath with exertion. Denies Wheezing.  Cardiovascular: Denies Chest pain at rest. Denies Chest pain with exertion. Denies Irregular heartbeat. Denies Palpitations.  Gastrointestinal: Denies Abdominal pain. Denies Diarrhea. Denies Nausea. Denies Vomiting. Denies Hematemesis or Hematochezia.  Genitourinary: Denies Dysuria. Denies Urinary frequency. Denies Urinary urgency. Denies Blood in urine.  Endocrine: Denies Cold intolerance. Denies Excessive thirst. Denies Heat intolerance. Denies Weight loss. Denies Weight gain.  Musculoskeletal: Denies Painful joints. Denies Weakness.  Integumentary: Denies Rash. Denies Itching. Denies Dry skin.  Neurologic: Denies Dizziness. Denies Fainting. Denies Headache.  Psychiatric: Denies Depression. Reports Anxiety. Denies Suicidal/Homicidal ideations.    All Other ROS: Negative except as stated in HPI.       Objective:     /82 (BP Location: Left arm, Patient Position: Sitting, BP Method: Large (Automatic))   Pulse 89   Resp 16   Ht 5' 4" (1.626 m)   Wt 84.8 kg (187 lb)   SpO2 99%   BMI 32.10 kg/m²     Physical Exam    General: Alert and oriented, No acute distress. Obese.   Head: Normocephalic, Atraumatic.  Eye: Pupils are equal, round and reactive to light, Extraocular movements are intact, Sclera non-icteric.  Ears/Nose/Throat: Normal, Mucosa moist,Clear.  Neck/Thyroid: Supple, Non-tender, No carotid bruit, No palpable thyromegaly or thyroid nodule, No " lymphadenopathy, No JVD, Full range of motion.  Respiratory: Clear to auscultation bilaterally; No wheezes, rales or rhonchi,Non-labored respirations, Symmetrical chest wall expansion.  Cardiovascular: Regular rate and rhythm, S1/S2 normal, No murmurs, rubs or gallops.  Gastrointestinal: Soft, Non-tender, Non-distended, Normal bowel sounds, No palpable organomegaly.  Musculoskeletal: Normal range of motion.  Integumentary: Warm, Dry, Intact, No suspicious lesions or rashes.  Extremities: No clubbing, cyanosis or edema  Neurologic: No focal deficits, Cranial Nerves II-XII are grossly intact, Motor strength normal upper and lower extremities, Sensory exam intact.  Psychiatric: Normal interaction, Coherent speech, Euthymic mood, Appropriate affect     *Lab results from 07/01/2024 were reviewed and discussed with patient and patient voices understanding.*      Assessment:       ICD-10-CM ICD-9-CM   1. Generalized anxiety disorder  F41.1 300.02   2. Class 1 obesity due to excess calories with serious comorbidity and body mass index (BMI) of 32.0 to 32.9 in adult  E66.09 278.00    Z68.32 V85.32   3. Neutropenia, unspecified type  D70.9 288.00   4. Hypercalcemia  E83.52 275.42   5. Asymptomatic microscopic hematuria  R31.21 599.72   6. Visit for screening mammogram  Z12.31 V76.12        Plan:     Problem List Items Addressed This Visit          Psychiatric    Generalized anxiety disorder - Primary    Relevant Medications    busPIRone (BUSPAR) 7.5 MG tablet     Other Visit Diagnoses       Class 1 obesity due to excess calories with serious comorbidity and body mass index (BMI) of 32.0 to 32.9 in adult        Relevant Medications    phentermine (ADIPEX-P) 37.5 mg tablet    Neutropenia, unspecified type        Relevant Orders    CBC Auto Differential    Hypercalcemia        Relevant Orders    Comprehensive Metabolic Panel    PTH, intact    Asymptomatic microscopic hematuria        Relevant Orders    Urinalysis, Reflex to  Urine Culture    Visit for screening mammogram        Relevant Orders    Mammo Digital Screening Bilat w/ Varun         1. Generalized anxiety disorder  - busPIRone (BUSPAR) 7.5 MG tablet; Take 1 tablet (7.5 mg total) by mouth 2 (two) times daily.  Dispense: 60 tablet; Refill: 2  - Rx trial of increased dose of Buspirone to 7.5mg x bid. Continue relaxation techniques. Will titrate medication as needed/tolerated. Notify M.D. or ER if symptoms persist or worsen, SI/HI, temp greater than 100.4, or any acute illness.    Start   /  Continue   Practice deep breathing or abdominal breathing exercises when anxiety occurs.  Exercise daily. Get sunlight daily.  Avoid caffeine, alcohol and stimulants.  Practice positive phrases and repeat throughout the day, yoga, lavender scents or Chamomile tea will help anxiety.  Set healthy boundaries, avoid people and conversations that increase stress.  Reports any symptoms of suicidal or homicidal ideations immediately, if clinic is closed go to nearest emergency room.     2. Class 1 obesity due to excess calories with serious comorbidity and body mass index (BMI) of 32.0 to 32.9 in adult  - phentermine (ADIPEX-P) 37.5 mg tablet; Take 0.5 tablets (18.75 mg total) by mouth 2 (two) times daily with meals.  Dispense: 30 tablet; Refill: 0  - Diet, exercise, and 10% weight loss encouraged. Will prescribe Adipex for max of 2 months. reviewed today. Will discontinue Adipex and patient to notify M.D. or ER if BP >140/90, chest pain, palpitations, SI/HI with Adipex. Notify M.D. or ER if temp greater than 100.4 or any acute illness.   Body mass index is 32.1 kg/m².  Goal BMI <30.  Exercise 5 times a week for 30 minutes per day.  Avoid soda, simple sugars, excessive rice, potatoes or bread. Limit fast foods and fried foods.  Choose complex carbs in moderation (example: green vegetables, beans, oatmeal). Eat plenty of fresh fruits and vegetables with lean meats daily.  Do not skip meals. Eat a  balanced portion size.  Avoid fad diets. Consider permanent healthy life style changes.      3. Neutropenia, unspecified type  - Asymptomatic, repeat CBC Auto Differential; Future, if WBC is still low, will proceed with neutropenia workup.     4. Hypercalcemia, asymptomatic  - Comprehensive Metabolic Panel; Future  - PTH, intact; Future  - Will treat pending results.     5. Asymptomatic microscopic hematuria  - Repeat Urinalysis, Reflex to Urine Culture; Future, if hematuria is still present, will proceed with CT abdomen/pelvis without contrast-stone study. Notify M.D. or ER if symptoms persist or worsen, gross hematuria, abdominal pain, vomiting, temp >100.4, or any acute illness.      6. Visit for screening mammogram  - Mammo Digital Screening Bilat w/ Varun; Future  - Mammo Digital Screening Bilat w/ Varun        Kelly was seen today for medication refill and anxiety.    Diagnoses and all orders for this visit:    Generalized anxiety disorder  -     busPIRone (BUSPAR) 7.5 MG tablet; Take 1 tablet (7.5 mg total) by mouth 2 (two) times daily.    Class 1 obesity due to excess calories with serious comorbidity and body mass index (BMI) of 32.0 to 32.9 in adult  -     phentermine (ADIPEX-P) 37.5 mg tablet; Take 0.5 tablets (18.75 mg total) by mouth 2 (two) times daily with meals.    Neutropenia, unspecified type  -     CBC Auto Differential; Future    Hypercalcemia  -     Comprehensive Metabolic Panel; Future  -     PTH, intact; Future    Asymptomatic microscopic hematuria  -     Urinalysis, Reflex to Urine Culture; Future    Visit for screening mammogram  -     Mammo Digital Screening Bilat w/ Varun; Future  -     Mammo Digital Screening Bilat w/ Varun          Medication List with Changes/Refills   Current Medications    FLUTICASONE PROPIONATE (FLONASE) 50 MCG/ACTUATION NASAL SPRAY    1 spray by Each Nostril route once daily.       Start Date: --        End Date: --    LORATADINE (CLARITIN) 10 MG TABLET    Take 1  tablet (10 mg total) by mouth once daily.       Start Date: 6/17/2024 End Date: --    LOSARTAN (COZAAR) 100 MG TABLET    Take 1 tablet (100 mg total) by mouth once daily.       Start Date: 6/3/2024  End Date: 6/3/2025    PANTOPRAZOLE (PROTONIX) 40 MG TABLET    Take 1 tablet (40 mg total) by mouth once daily.       Start Date: 6/3/2024  End Date: --    PRAVASTATIN (PRAVACHOL) 40 MG TABLET    Take 1 tablet (40 mg total) by mouth once daily.       Start Date: 6/3/2024  End Date: 6/3/2025    RIZATRIPTAN (MAXALT-MLT) 10 MG DISINTEGRATING TABLET    Take 10 mg by mouth.       Start Date: 3/10/2023 End Date: --   Changed and/or Refilled Medications    Modified Medication Previous Medication    BUSPIRONE (BUSPAR) 7.5 MG TABLET busPIRone (BUSPAR) 5 MG Tab       Take 1 tablet (7.5 mg total) by mouth 2 (two) times daily.    Take 1 tablet (5 mg total) by mouth 2 (two) times daily.       Start Date: 8/27/2024 End Date: 11/25/2024    Start Date: 8/19/2024 End Date: 8/27/2024    PHENTERMINE (ADIPEX-P) 37.5 MG TABLET phentermine (ADIPEX-P) 37.5 mg tablet       Take 0.5 tablets (18.75 mg total) by mouth 2 (two) times daily with meals.    Take 37.5 mg by mouth before breakfast.       Start Date: 8/27/2024 End Date: 9/26/2024    Start Date: --        End Date: 8/27/2024          Follow up in about 4 weeks (around 9/24/2024) for Obesity Followup.

## 2024-09-17 ENCOUNTER — TELEPHONE (OUTPATIENT)
Dept: FAMILY MEDICINE | Facility: CLINIC | Age: 54
End: 2024-09-17
Payer: COMMERCIAL

## 2024-09-17 NOTE — TELEPHONE ENCOUNTER
Called pt to explain the nurses do not have full access to the schedule and that I would transfer the message to our

## 2024-09-17 NOTE — TELEPHONE ENCOUNTER
----- Message from Ganesh Cee sent at 9/17/2024 11:47 AM CDT -----  Who Called: Jerrodkanchan Levin    Caller is requesting a sooner appointment. Caller declined first available appointment listed below. Caller will not accept being placed on the waitlist and is requesting a message be sent to doctor.    When is the first available appointment?01/14  Options offered (Virtual Visit, Urgent Care):   Symptoms:sooner appt      Preferred Method of Contact: Phone Call  Patient's Preferred Phone Number on File: [phone]   Best Call Back Number, if different:  Additional Information: Pt states she needs a sooner appt for a med refill and she goes every 2 months.

## 2024-09-26 ENCOUNTER — TELEPHONE (OUTPATIENT)
Dept: FAMILY MEDICINE | Facility: CLINIC | Age: 54
End: 2024-09-26
Payer: COMMERCIAL

## 2024-09-26 ENCOUNTER — LAB VISIT (OUTPATIENT)
Dept: LAB | Facility: HOSPITAL | Age: 54
End: 2024-09-26
Attending: FAMILY MEDICINE
Payer: COMMERCIAL

## 2024-09-26 DIAGNOSIS — E21.3 HYPERPARATHYROIDISM: Primary | ICD-10-CM

## 2024-09-26 DIAGNOSIS — R31.21 ASYMPTOMATIC MICROSCOPIC HEMATURIA: ICD-10-CM

## 2024-09-26 LAB
BILIRUB UR QL STRIP.AUTO: NEGATIVE
CLARITY UR: CLEAR
COLOR UR AUTO: NORMAL
GLUCOSE UR QL STRIP: NEGATIVE
HGB UR QL STRIP: NEGATIVE
KETONES UR QL STRIP: NEGATIVE
LEUKOCYTE ESTERASE UR QL STRIP: NEGATIVE
NITRITE UR QL STRIP: NEGATIVE
PH UR STRIP: 6.5 [PH]
PROT UR QL STRIP: NEGATIVE
SP GR UR STRIP.AUTO: <=1.005 (ref 1–1.03)
UROBILINOGEN UR STRIP-ACNC: 0.2

## 2024-09-26 PROCEDURE — 81003 URINALYSIS AUTO W/O SCOPE: CPT

## 2024-09-26 NOTE — TELEPHONE ENCOUNTER
Pt sees Dr Raul Will, she was referred to dr Arellano and asked if she could see DR Will for the PTH issue. Please advise Thanks

## 2024-09-26 NOTE — TELEPHONE ENCOUNTER
----- Message from Oscar Barr sent at 9/26/2024  3:37 PM CDT -----  .Type:  Needs Medical Advice    Who Called: Demetreica  Symptoms (please be specific):    How long has patient had these symptoms:    Pharmacy name and phone #:    Would the patient rather a call back or a response via [facility]?   Best Call Back Number: [phone]  Additional Information: Patient requested a call back from the nurse she told me she talked to her earlier. Thanks,

## 2024-09-26 NOTE — TELEPHONE ENCOUNTER
----- Message from Oscar Barr sent at 9/26/2024  3:37 PM CDT -----  .Type:  Needs Medical Advice    Who Called: Demetreica  Symptoms (please be specific):    How long has patient had these symptoms:    Pharmacy name and phone #:    Would the patient rather a call back or a response via [facility]?   Best Call Back Number: [phone]  Additional Information: Patient requested a call back from the nurse she told me she talked to her earlier. Thanks,

## 2024-09-26 NOTE — TELEPHONE ENCOUNTER
----- Message from Gladys Hernandez MD sent at 9/26/2024 12:34 PM CDT -----  Intact PTH is elevated and suggestive of hyperparathyroidism. Hyperparathyroidism is when your parathyroid glands produce too much parathyroid hormone (PTH). This can cause high levels of calcium in your blood. Hyperparathyroidism can be primary (caused by growths or enlarged glands) or secondary (caused by kidney disease or low calcium levels). Treatments include surgery, medications and lifestyle changes. I placed an order for her to see an Endocrinologist for further evaluation and treatment.

## 2024-10-04 ENCOUNTER — OFFICE VISIT (OUTPATIENT)
Dept: FAMILY MEDICINE | Facility: CLINIC | Age: 54
End: 2024-10-04
Payer: COMMERCIAL

## 2024-10-04 VITALS
DIASTOLIC BLOOD PRESSURE: 86 MMHG | OXYGEN SATURATION: 98 % | BODY MASS INDEX: 31.58 KG/M2 | RESPIRATION RATE: 16 BRPM | HEIGHT: 64 IN | HEART RATE: 86 BPM | WEIGHT: 185 LBS | SYSTOLIC BLOOD PRESSURE: 125 MMHG

## 2024-10-04 DIAGNOSIS — E21.3 HYPERPARATHYROIDISM: ICD-10-CM

## 2024-10-04 DIAGNOSIS — F41.1 GENERALIZED ANXIETY DISORDER: Primary | ICD-10-CM

## 2024-10-04 DIAGNOSIS — E66.09 CLASS 1 OBESITY DUE TO EXCESS CALORIES WITH SERIOUS COMORBIDITY AND BODY MASS INDEX (BMI) OF 31.0 TO 31.9 IN ADULT: ICD-10-CM

## 2024-10-04 DIAGNOSIS — Z23 FLU VACCINE NEED: ICD-10-CM

## 2024-10-04 DIAGNOSIS — E66.811 CLASS 1 OBESITY DUE TO EXCESS CALORIES WITH SERIOUS COMORBIDITY AND BODY MASS INDEX (BMI) OF 31.0 TO 31.9 IN ADULT: ICD-10-CM

## 2024-10-04 RX ORDER — PHENTERMINE HYDROCHLORIDE 37.5 MG/1
18.75 TABLET ORAL 2 TIMES DAILY WITH MEALS
Qty: 30 TABLET | Refills: 0 | Status: SHIPPED | OUTPATIENT
Start: 2024-10-04 | End: 2024-11-03

## 2024-10-04 NOTE — PROGRESS NOTES
Patient ID: [id number]     Chief Complaint: Anxiety and Obesity        HPI:     Kelly Levin is a 54 y.o. female here today for a follow up anxiety, obesity.  - Has been having problems with anxiety for the last few years seems to be getting worse over time.  No recent acute stressors.  Symptoms include  feeling nervous, anxious, on edge, not being able to stop control worrying, trouble relaxing, mind racing at night, becoming easily annoyed or irritable -which then leads to sadness, tearfulness-being overwhelmed.  Stressors and coping mechanisms discussed at length. She has tried Lexapro with drowsiness and she stopped taking the Lexapro several months ago. She has been taking Buspirone 7.5 mg x bid with improvement of symptoms and she feels like she is at  her baseline. She denies depression, SI/HI, or AH/VH. She is not interested in outpatient counseling.   - She is here for followup obesity, doing well with Adipex, lost 2 pounds since last visit with Adipex and lifestyle changes, no side effects, she needs Rx refill today.  Last Rx Adipex refill was 08/27/2024.  - Hyperparathyroidism is stable and asymptomatic, she would like referral to her ENT (Dr. Will) and to cancel referral to Endocrinology.   - She would like flu vaccine today.   - Patient is without any other complaints today.          -------------------------------------    Essential hypertension    Gastric reflux    Menstrual migraine without status migrainosus, not intractable    Mixed hyperlipidemia    Seasonal allergies        Past Surgical History:   Procedure Laterality Date    COLONOSCOPY  04/20/2021    REDUCTION OF BOTH BREASTS Bilateral 02/22/2022       Review of patient's allergies indicates:   Allergen Reactions    Ace inhibitors      Other reaction(s): Cough    Amlodipine Edema       Outpatient Medications Marked as Taking for the 10/4/24 encounter (Office Visit) with Gladys Hernandez MD   Medication Sig Dispense Refill     busPIRone (BUSPAR) 7.5 MG tablet Take 1 tablet (7.5 mg total) by mouth 2 (two) times daily. 60 tablet 2    fluticasone propionate (FLONASE) 50 mcg/actuation nasal spray 1 spray by Each Nostril route once daily.      loratadine (CLARITIN) 10 mg tablet Take 1 tablet (10 mg total) by mouth once daily. 90 tablet 3    losartan (COZAAR) 100 MG tablet Take 1 tablet (100 mg total) by mouth once daily. 90 tablet 3    pantoprazole (PROTONIX) 40 MG tablet Take 1 tablet (40 mg total) by mouth once daily. 90 tablet 0    pravastatin (PRAVACHOL) 40 MG tablet Take 1 tablet (40 mg total) by mouth once daily. 90 tablet 3    rizatriptan (MAXALT-MLT) 10 MG disintegrating tablet Take 10 mg by mouth.       Current Facility-Administered Medications for the 10/4/24 encounter (Office Visit) with Gladys Hernandez MD   Medication Dose Route Frequency Provider Last Rate Last Admin    influenza (Flulaval, Fluzone, Fluarix) 45 mcg/0.5 mL IM vaccine (> or = 6 mo) 0.5 mL  0.5 mL Intramuscular 1 time in Clinic/HOD            Social History     Socioeconomic History    Marital status: Single    Number of children: 3   Occupational History    Occupation: [employer]:    Tobacco Use    Smoking status: Never    Smokeless tobacco: Never   Substance and Sexual Activity    Alcohol use: Not Currently    Drug use: Never    Sexual activity: Not Currently     Social Drivers of Health     Financial Resource Strain: Low Risk  (7/8/2024)    Overall Financial Resource Strain (CARDIA)     Difficulty of Paying Living Expenses: Not hard at all   Food Insecurity: No Food Insecurity (7/8/2024)    Hunger Vital Sign     Worried About Running Out of Food in the Last Year: Never true     Ran Out of Food in the Last Year: Never true   Transportation Needs: No Transportation Needs (7/8/2024)    TRANSPORTATION NEEDS     Transportation : No   Physical Activity: Insufficiently Active (7/8/2024)    Exercise Vital Sign     Days of Exercise per Week: 2 days      "Minutes of Exercise per Session: 30 min   Stress: Stress Concern Present (7/8/2024)    [employer] [place] [employer] - Occupational Stress Questionnaire     Feeling of Stress : To some extent   Housing Stability: Low Risk  (7/8/2024)    Housing Stability Vital Sign     Unable to Pay for Housing in the Last Year: No     Homeless in the Last Year: No        Family History   Problem Relation Name Age of Onset    Hypertension Mother      Stroke Father          Subjective:       Review of Systems:    See HPI for details    Constitutional: Denies Change in appetite. Denies Chills. Denies Fever. Denies Night sweats.  Eye: Denies Blurred vision. Denies Discharge. Denies Eye pain.  ENT: Denies Decreased hearing. Denies Sore throat. Denies Swollen glands.  Respiratory: Denies Cough. Denies Shortness of breath. Denies Shortness of breath with exertion. Denies Wheezing.  Cardiovascular: Denies Chest pain at rest. Denies Chest pain with exertion. Denies Irregular heartbeat. Denies Palpitations.  Gastrointestinal: Denies Abdominal pain. Denies Diarrhea. Denies Nausea. Denies Vomiting. Denies Hematemesis or Hematochezia.  Genitourinary: Denies Dysuria. Denies Urinary frequency. Denies Urinary urgency. Denies Blood in urine.  Endocrine: Denies Cold intolerance. Denies Excessive thirst. Denies Heat intolerance. Denies Weight loss. Denies Weight gain.  Musculoskeletal: Denies Painful joints. Denies Weakness.  Integumentary: Denies Rash. Denies Itching. Denies Dry skin.  Neurologic: Denies Dizziness. Denies Fainting. Denies Headache.  Psychiatric: Denies Depression. Reports Anxiety. Denies Suicidal/Homicidal ideations.    All Other ROS: Negative except as stated in HPI.       Objective:     /86 (BP Location: Right arm, Patient Position: Sitting)   Pulse 86   Resp 16   Ht 5' 4" (1.626 m)   Wt 83.9 kg (185 lb)   SpO2 98%   BMI 31.76 kg/m²     Physical Exam    General: Alert and oriented, No acute distress. Obese. "   Head: Normocephalic, Atraumatic.  Eye: Pupils are equal, round and reactive to light, Extraocular movements are intact, Sclera non-icteric.  Ears/Nose/Throat: Normal, Mucosa moist,Clear.  Neck/Thyroid: Supple, Non-tender, No carotid bruit, No palpable thyromegaly or thyroid nodule, No lymphadenopathy, No JVD, Full range of motion.  Respiratory: Clear to auscultation bilaterally; No wheezes, rales or rhonchi,Non-labored respirations, Symmetrical chest wall expansion.  Cardiovascular: Regular rate and rhythm, S1/S2 normal, No murmurs, rubs or gallops.  Gastrointestinal: Soft, Non-tender, Non-distended, Normal bowel sounds, No palpable organomegaly.  Musculoskeletal: Normal range of motion.  Integumentary: Warm, Dry, Intact, No suspicious lesions or rashes.  Extremities: No clubbing, cyanosis or edema  Neurologic: No focal deficits, Cranial Nerves II-XII are grossly intact, Motor strength normal upper and lower extremities, Sensory exam intact.  Psychiatric: Normal interaction, Coherent speech, Euthymic mood, Appropriate affect         Assessment:       ICD-10-CM ICD-9-CM   1. Generalized anxiety disorder  F41.1 300.02   2. Class 1 obesity due to excess calories with serious comorbidity and body mass index (BMI) of 31.0 to 31.9 in adult  E66.811 278.00    E66.09 V85.31    Z68.31    3. Flu vaccine need  Z23 V04.81   4. Hyperparathyroidism  E21.3 252.00        Plan:     Problem List Items Addressed This Visit          Psychiatric    Generalized anxiety disorder - Primary       Endocrine    Hyperparathyroidism    Relevant Orders    Ambulatory referral/consult to ENT     Other Visit Diagnoses       Class 1 obesity due to excess calories with serious comorbidity and body mass index (BMI) of 31.0 to 31.9 in adult        Relevant Medications    phentermine (ADIPEX-P) 37.5 mg tablet    Flu vaccine need        Relevant Medications    influenza (Flulaval, Fluzone, Fluarix) 45 mcg/0.5 mL IM vaccine (> or = 6 mo) 0.5 mL          1. Generalized anxiety disorder  - Continue Buspirone as directed. Continue relaxation techniques. Will titrate medication as needed/tolerated. Notify M.D. or ER if symptoms persist or worsen, SI/HI, temp greater than 100.4, or any acute illness.    Start   /  Continue   Practice deep breathing or abdominal breathing exercises when anxiety occurs.  Exercise daily. Get sunlight daily.  Avoid caffeine, alcohol and stimulants.  Practice positive phrases and repeat throughout the day, yoga, lavender scents or Chamomile tea will help anxiety.  Set healthy boundaries, avoid people and conversations that increase stress.  Reports any symptoms of suicidal or homicidal ideations immediately, if clinic is closed go to nearest emergency room.     2. Class 1 obesity due to excess calories with serious comorbidity and body mass index (BMI) of 31.0 to 31.9 in adult  - phentermine (ADIPEX-P) 37.5 mg tablet; Take 0.5 tablets (18.75 mg total) by mouth 2 (two) times daily with meals.  Dispense: 30 tablet; Refill: 0  - Improving. Diet, exercise, and 10% weight loss encouraged. Will prescribe Adipex for max of 2 more months.  reviewed today. Will discontinue Adipex and patient to notify M.D. or ER if BP >140/90, chest pain, palpitations, SI/HI with Adipex. Notify M.D. or ER if temp greater than 100.4 or any acute illness.   Body mass index is 31.76 kg/m².  Goal BMI <30.  Exercise 5 times a week for 30 minutes per day.  Avoid soda, simple sugars, excessive rice, potatoes or bread. Limit fast foods and fried foods.  Choose complex carbs in moderation (example: green vegetables, beans, oatmeal). Eat plenty of fresh fruits and vegetables with lean meats daily.  Do not skip meals. Eat a balanced portion size.  Avoid fad diets. Consider permanent healthy life style changes.      3. Flu vaccine need  - influenza (Flulaval, Fluzone, Fluarix) 45 mcg/0.5 mL IM vaccine (> or = 6 mo) 0.5 mL IM x 1 today    4. Hyperparathyroidism  - Ambulatory  referral/consult to ENT; Future for evaluation and treatment        Kelly was seen today for anxiety and obesity.    Diagnoses and all orders for this visit:    Generalized anxiety disorder    Class 1 obesity due to excess calories with serious comorbidity and body mass index (BMI) of 31.0 to 31.9 in adult  -     phentermine (ADIPEX-P) 37.5 mg tablet; Take 0.5 tablets (18.75 mg total) by mouth 2 (two) times daily with meals.    Flu vaccine need  -     influenza (Flulaval, Fluzone, Fluarix) 45 mcg/0.5 mL IM vaccine (> or = 6 mo) 0.5 mL    Hyperparathyroidism  -     Ambulatory referral/consult to ENT; Future          Medication List with Changes/Refills   Current Medications    BUSPIRONE (BUSPAR) 7.5 MG TABLET    Take 1 tablet (7.5 mg total) by mouth 2 (two) times daily.       Start Date: 8/27/2024 End Date: 11/25/2024    FLUTICASONE PROPIONATE (FLONASE) 50 MCG/ACTUATION NASAL SPRAY    1 spray by Each Nostril route once daily.       Start Date: --        End Date: --    LORATADINE (CLARITIN) 10 MG TABLET    Take 1 tablet (10 mg total) by mouth once daily.       Start Date: 6/17/2024 End Date: --    LOSARTAN (COZAAR) 100 MG TABLET    Take 1 tablet (100 mg total) by mouth once daily.       Start Date: 6/3/2024  End Date: 6/3/2025    PANTOPRAZOLE (PROTONIX) 40 MG TABLET    Take 1 tablet (40 mg total) by mouth once daily.       Start Date: 6/3/2024  End Date: --    PRAVASTATIN (PRAVACHOL) 40 MG TABLET    Take 1 tablet (40 mg total) by mouth once daily.       Start Date: 6/3/2024  End Date: 6/3/2025    RIZATRIPTAN (MAXALT-MLT) 10 MG DISINTEGRATING TABLET    Take 10 mg by mouth.       Start Date: 3/10/2023 End Date: --   Changed and/or Refilled Medications    Modified Medication Previous Medication    PHENTERMINE (ADIPEX-P) 37.5 MG TABLET phentermine (ADIPEX-P) 37.5 mg tablet       Take 0.5 tablets (18.75 mg total) by mouth 2 (two) times daily with meals.    Take 0.5 tablets (18.75 mg total) by mouth 2 (two) times  daily with meals.       Start Date: 10/4/2024 End Date: 11/3/2024    Start Date: 8/27/2024 End Date: 10/4/2024          Follow up in about 4 weeks (around 11/1/2024) for Obesity Followup- with MD, needs to be in person and needs to be in 4 weeks. .

## 2024-10-07 PROBLEM — Z00.00 WELLNESS EXAMINATION: Status: RESOLVED | Noted: 2024-07-08 | Resolved: 2024-10-07

## 2024-11-05 ENCOUNTER — OFFICE VISIT (OUTPATIENT)
Dept: FAMILY MEDICINE | Facility: CLINIC | Age: 54
End: 2024-11-05
Payer: COMMERCIAL

## 2024-11-05 VITALS
HEART RATE: 90 BPM | BODY MASS INDEX: 31.24 KG/M2 | SYSTOLIC BLOOD PRESSURE: 120 MMHG | DIASTOLIC BLOOD PRESSURE: 85 MMHG | RESPIRATION RATE: 17 BRPM | HEIGHT: 64 IN | WEIGHT: 183 LBS | OXYGEN SATURATION: 99 %

## 2024-11-05 DIAGNOSIS — E66.09 CLASS 1 OBESITY DUE TO EXCESS CALORIES WITH SERIOUS COMORBIDITY AND BODY MASS INDEX (BMI) OF 31.0 TO 31.9 IN ADULT: ICD-10-CM

## 2024-11-05 DIAGNOSIS — E66.811 CLASS 1 OBESITY DUE TO EXCESS CALORIES WITH SERIOUS COMORBIDITY AND BODY MASS INDEX (BMI) OF 31.0 TO 31.9 IN ADULT: ICD-10-CM

## 2024-11-05 RX ORDER — PHENTERMINE HYDROCHLORIDE 37.5 MG/1
18.75 TABLET ORAL 2 TIMES DAILY WITH MEALS
Qty: 30 TABLET | Refills: 0 | Status: SHIPPED | OUTPATIENT
Start: 2024-11-05 | End: 2024-12-05

## 2024-11-05 RX ORDER — DOXYCYCLINE HYCLATE 50 MG/1
50 CAPSULE, GELATIN COATED ORAL
COMMUNITY
Start: 2024-10-17

## 2024-11-05 NOTE — PROGRESS NOTES
Patient ID: [id number]     Chief Complaint: Obesity        HPI:     Kelly Levin is a 54 y.o. female here today for a follow up obesity.   - She is here for followup obesity, doing well with Adipex, lost 2 pounds since last visit with Adipex and lifestyle changes, no side effects, she needs Rx refill today.  Last Rx Adipex refill was 10/04/2024.  - Patient is without any other complaints today.          -------------------------------------    Essential hypertension    Gastric reflux    Menstrual migraine without status migrainosus, not intractable    Mixed hyperlipidemia    Seasonal allergies        Past Surgical History:   Procedure Laterality Date    COLONOSCOPY  04/20/2021    REDUCTION OF BOTH BREASTS Bilateral 02/22/2022       Review of patient's allergies indicates:   Allergen Reactions    Ace inhibitors      Other reaction(s): Cough    Amlodipine Edema       Outpatient Medications Marked as Taking for the 11/5/24 encounter (Office Visit) with Gladys Hernandez MD   Medication Sig Dispense Refill    busPIRone (BUSPAR) 7.5 MG tablet Take 1 tablet (7.5 mg total) by mouth 2 (two) times daily. 60 tablet 2    doxycycline 50 MG capsule Take 50 mg by mouth.      fluticasone propionate (FLONASE) 50 mcg/actuation nasal spray 1 spray by Each Nostril route once daily.      loratadine (CLARITIN) 10 mg tablet Take 1 tablet (10 mg total) by mouth once daily. 90 tablet 3    losartan (COZAAR) 100 MG tablet Take 1 tablet (100 mg total) by mouth once daily. 90 tablet 3    pantoprazole (PROTONIX) 40 MG tablet Take 1 tablet (40 mg total) by mouth once daily. 90 tablet 0    pravastatin (PRAVACHOL) 40 MG tablet Take 1 tablet (40 mg total) by mouth once daily. 90 tablet 3    rizatriptan (MAXALT-MLT) 10 MG disintegrating tablet Take 10 mg by mouth.         Social History     Socioeconomic History    Marital status: Single    Number of children: 3   Occupational History    Occupation: [employer]:    Tobacco Use     Smoking status: Never    Smokeless tobacco: Never   Substance and Sexual Activity    Alcohol use: Not Currently    Drug use: Never    Sexual activity: Not Currently     Social Drivers of Health     Financial Resource Strain: Low Risk  (7/8/2024)    Overall Financial Resource Strain (CARDIA)     Difficulty of Paying Living Expenses: Not hard at all   Food Insecurity: No Food Insecurity (7/8/2024)    Hunger Vital Sign     Worried About Running Out of Food in the Last Year: Never true     Ran Out of Food in the Last Year: Never true   Transportation Needs: No Transportation Needs (7/8/2024)    TRANSPORTATION NEEDS     Transportation : No   Physical Activity: Insufficiently Active (7/8/2024)    Exercise Vital Sign     Days of Exercise per Week: 2 days     Minutes of Exercise per Session: 30 min   Stress: Stress Concern Present (7/8/2024)    [employer] - Occupational Stress Questionnaire     Feeling of Stress : To some extent   Housing Stability: Low Risk  (7/8/2024)    Housing Stability Vital Sign     Unable to Pay for Housing in the Last Year: No     Homeless in the Last Year: No        Family History   Problem Relation Name Age of Onset    Hypertension Mother      Stroke Father          Subjective:       Review of Systems:    See HPI for details    Constitutional: Denies Change in appetite. Denies Chills. Denies Fever. Denies Night sweats.  Eye: Denies Blurred vision. Denies Discharge. Denies Eye pain.  ENT: Denies Decreased hearing. Denies Sore throat. Denies Swollen glands.  Respiratory: Denies Cough. Denies Shortness of breath. Denies Shortness of breath with exertion. Denies Wheezing.  Cardiovascular: Denies Chest pain at rest. Denies Chest pain with exertion. Denies Irregular heartbeat. Denies Palpitations.  Gastrointestinal: Denies Abdominal pain. Denies Diarrhea. Denies Nausea. Denies Vomiting. Denies Hematemesis or Hematochezia.  Genitourinary: Denies Dysuria. Denies Urinary  "frequency. Denies Urinary urgency. Denies Blood in urine.  Endocrine: Denies Cold intolerance. Denies Excessive thirst. Denies Heat intolerance. Denies Weight loss. Denies Weight gain.  Musculoskeletal: Denies Painful joints. Denies Weakness.  Integumentary: Denies Rash. Denies Itching. Denies Dry skin.  Neurologic: Denies Dizziness. Denies Fainting. Denies Headache.  Psychiatric: Denies Depression. Denies Anxiety. Denies Suicidal/Homicidal ideations.    All Other ROS: Negative except as stated in HPI.       Objective:     /85 (BP Location: Right arm, Patient Position: Sitting)   Pulse 90   Resp 17   Ht 5' 4" (1.626 m)   Wt 83 kg (183 lb)   SpO2 99%   BMI 31.41 kg/m²     Physical Exam    General: Alert and oriented, No acute distress. Obese.   Head: Normocephalic, Atraumatic.  Eye: Pupils are equal, round and reactive to light, Extraocular movements are intact, Sclera non-icteric.  Ears/Nose/Throat: Normal, Mucosa moist,Clear.  Neck/Thyroid: Supple, Non-tender, No carotid bruit, No palpable thyromegaly or thyroid nodule, No lymphadenopathy, No JVD, Full range of motion.  Respiratory: Clear to auscultation bilaterally; No wheezes, rales or rhonchi,Non-labored respirations, Symmetrical chest wall expansion.  Cardiovascular: Regular rate and rhythm, S1/S2 normal, No murmurs, rubs or gallops.  Gastrointestinal: Soft, Non-tender, Non-distended, Normal bowel sounds, No palpable organomegaly.  Musculoskeletal: Normal range of motion.  Integumentary: Warm, Dry, Intact, No suspicious lesions or rashes.  Extremities: No clubbing, cyanosis or edema  Neurologic: No focal deficits, Cranial Nerves II-XII are grossly intact, Motor strength normal upper and lower extremities, Sensory exam intact.  Psychiatric: Normal interaction, Coherent speech, Euthymic mood, Appropriate affect         Assessment:       ICD-10-CM ICD-9-CM   1. Class 1 obesity due to excess calories with serious comorbidity and body mass index (BMI) of " 31.0 to 31.9 in adult  E66.811 278.00    E66.09 V85.31    Z68.31         Plan:     Problem List Items Addressed This Visit    None  Visit Diagnoses       Class 1 obesity due to excess calories with serious comorbidity and body mass index (BMI) of 31.0 to 31.9 in adult        Relevant Medications    phentermine (ADIPEX-P) 37.5 mg tablet         1. Class 1 obesity due to excess calories with serious comorbidity and body mass index (BMI) of 31.0 to 31.9 in adult  - phentermine (ADIPEX-P) 37.5 mg tablet; Take 0.5 tablets (18.75 mg total) by mouth 2 (two) times daily with meals.  Dispense: 30 tablet; Refill: 0  - Improving. Diet, exercise, and 10% weight loss encouraged. Will prescribe Adipex for max of 1 more month.  reviewed today. Will discontinue Adipex and patient to notify M.D. or ER if BP >140/90, chest pain, palpitations, SI/HI with Adipex. Notify M.D. or ER if temp greater than 100.4 or any acute illness.   Body mass index is 31.41 kg/m².  Goal BMI <30.  Exercise 5 times a week for 30 minutes per day.  Avoid soda, simple sugars, excessive rice, potatoes or bread. Limit fast foods and fried foods.  Choose complex carbs in moderation (example: green vegetables, beans, oatmeal). Eat plenty of fresh fruits and vegetables with lean meats daily.  Do not skip meals. Eat a balanced portion size.  Avoid fad diets. Consider permanent healthy life style changes.      Kelly was seen today for obesity.    Diagnoses and all orders for this visit:    Class 1 obesity due to excess calories with serious comorbidity and body mass index (BMI) of 31.0 to 31.9 in adult  -     phentermine (ADIPEX-P) 37.5 mg tablet; Take 0.5 tablets (18.75 mg total) by mouth 2 (two) times daily with meals.          Medication List with Changes/Refills   Current Medications    BUSPIRONE (BUSPAR) 7.5 MG TABLET    Take 1 tablet (7.5 mg total) by mouth 2 (two) times daily.       Start Date: 8/27/2024 End Date: 11/25/2024    DOXYCYCLINE 50 MG  CAPSULE    Take 50 mg by mouth.       Start Date: 10/17/2024End Date: --    FLUTICASONE PROPIONATE (FLONASE) 50 MCG/ACTUATION NASAL SPRAY    1 spray by Each Nostril route once daily.       Start Date: --        End Date: --    LORATADINE (CLARITIN) 10 MG TABLET    Take 1 tablet (10 mg total) by mouth once daily.       Start Date: 6/17/2024 End Date: --    LOSARTAN (COZAAR) 100 MG TABLET    Take 1 tablet (100 mg total) by mouth once daily.       Start Date: 6/3/2024  End Date: 6/3/2025    PANTOPRAZOLE (PROTONIX) 40 MG TABLET    Take 1 tablet (40 mg total) by mouth once daily.       Start Date: 6/3/2024  End Date: --    PRAVASTATIN (PRAVACHOL) 40 MG TABLET    Take 1 tablet (40 mg total) by mouth once daily.       Start Date: 6/3/2024  End Date: 6/3/2025    RIZATRIPTAN (MAXALT-MLT) 10 MG DISINTEGRATING TABLET    Take 10 mg by mouth.       Start Date: 3/10/2023 End Date: --   Changed and/or Refilled Medications    Modified Medication Previous Medication    PHENTERMINE (ADIPEX-P) 37.5 MG TABLET phentermine (ADIPEX-P) 37.5 mg tablet       Take 0.5 tablets (18.75 mg total) by mouth 2 (two) times daily with meals.    Take 0.5 tablets (18.75 mg total) by mouth 2 (two) times daily with meals.       Start Date: 11/5/2024 End Date: 12/5/2024    Start Date: 10/4/2024 End Date: 11/5/2024          Follow up in about 4 weeks (around 12/3/2024) for Obesity Followup, In office Followup.

## 2024-11-06 ENCOUNTER — PATIENT MESSAGE (OUTPATIENT)
Dept: FAMILY MEDICINE | Facility: CLINIC | Age: 54
End: 2024-11-06
Payer: COMMERCIAL

## 2024-11-06 ENCOUNTER — DOCUMENTATION ONLY (OUTPATIENT)
Dept: FAMILY MEDICINE | Facility: CLINIC | Age: 54
End: 2024-11-06
Payer: COMMERCIAL

## 2024-11-06 LAB — BCS RECOMMENDATION EXT: NORMAL

## 2024-12-03 ENCOUNTER — OFFICE VISIT (OUTPATIENT)
Dept: FAMILY MEDICINE | Facility: CLINIC | Age: 54
End: 2024-12-03
Payer: COMMERCIAL

## 2024-12-03 VITALS
SYSTOLIC BLOOD PRESSURE: 134 MMHG | WEIGHT: 186.69 LBS | HEART RATE: 86 BPM | DIASTOLIC BLOOD PRESSURE: 88 MMHG | HEIGHT: 64 IN | OXYGEN SATURATION: 99 % | BODY MASS INDEX: 31.87 KG/M2 | TEMPERATURE: 97 F | RESPIRATION RATE: 18 BRPM

## 2024-12-03 DIAGNOSIS — E66.09 CLASS 1 OBESITY DUE TO EXCESS CALORIES WITH SERIOUS COMORBIDITY AND BODY MASS INDEX (BMI) OF 32.0 TO 32.9 IN ADULT: Primary | ICD-10-CM

## 2024-12-03 DIAGNOSIS — E66.811 CLASS 1 OBESITY DUE TO EXCESS CALORIES WITH SERIOUS COMORBIDITY AND BODY MASS INDEX (BMI) OF 32.0 TO 32.9 IN ADULT: Primary | ICD-10-CM

## 2024-12-03 DIAGNOSIS — F41.1 GENERALIZED ANXIETY DISORDER: ICD-10-CM

## 2024-12-03 RX ORDER — ALPRAZOLAM 0.25 MG/1
0.25 TABLET ORAL NIGHTLY PRN
Qty: 30 TABLET | Refills: 1 | Status: SHIPPED | OUTPATIENT
Start: 2024-12-03 | End: 2025-02-01

## 2024-12-03 RX ORDER — PHENTERMINE HYDROCHLORIDE 37.5 MG/1
18.75 TABLET ORAL 2 TIMES DAILY WITH MEALS
Qty: 30 TABLET | Refills: 0 | Status: SHIPPED | OUTPATIENT
Start: 2024-12-03 | End: 2025-01-02

## 2024-12-03 NOTE — PATIENT INSTRUCTIONS
Lj Mendoza,     If you are due for any health screening(s) below please notify me so we can arrange them to be ordered and scheduled. Most healthy patients at your age complete them, but you are free to accept or refuse.     If you can't do it, I'll definitely understand. If you can, I'd certainly appreciate it!    All of your core healthy metrics are met.      Lets manage your high blood pressure     Your blood pressure was above 140/90 today during your visit. We recommend that you schedule a nurse visit in two weeks to check your blood pressure and discuss ways to support your health goals.     You can also manage your health and record your blood pressure from the comfort of home by keeping a daily blood pressure log. These results are shared with and reviewed by your provider. Please print this form (Daily Blood Pressure Log) to assist you in keeping track of your blood pressure at home.     Schedule your nurse visit in two weeks to learn more about how to track and manage high blood pressure.    Daily Blood Pressure Log    Name:__________________________________                  Date of Birth:_________    Average Blood Pressure:  __________      Date: Time  (a.m.) Blood  Pressure: Pulse  Rate: Time  (p.m.) Blood  Pressure : Pulse  Rate:   Sample 8:37 127/83 84

## 2024-12-03 NOTE — PROGRESS NOTES
Patient ID: [id number]     Chief Complaint: Weight Loss        HPI:     Kelly Levin is a 54 y.o. female here today for a follow up obese.   - She is here for followup obesity, doing well with Adipex, gained 3 pounds since last visit due to non-compliance due to Thanksgiving holiday, but she plans to get back on track with her diet, she is doing well with Adipex and lifestyle changes, no side effects, she needs Rx refill today.  Last Rx Adipex refill was 11/05/2024.  - - Has been having problems with anxiety for the last few years seems to be getting worse over time.  No recent acute stressors.  Symptoms include  feeling nervous, anxious, on edge, not being able to stop control worrying, trouble relaxing, mind racing at night, becoming easily annoyed or irritable -which then leads to sadness, tearfulness-being overwhelmed.  Stressors and coping mechanisms discussed at length. She has tried Lexapro with drowsiness and she stopped taking the Lexapro several months ago. She has been taking Buspirone 7.5 mg x bid without improvement of symptoms and she would like to try a different short term Rx if possible. She denies depression, SI/HI, or AH/VH. She is not interested in outpatient counseling.   - Patient is without any other complaints today.           -------------------------------------    Essential hypertension    Gastric reflux    Menstrual migraine without status migrainosus, not intractable    Mixed hyperlipidemia    Seasonal allergies        Past Surgical History:   Procedure Laterality Date    COLONOSCOPY  04/20/2021    REDUCTION OF BOTH BREASTS Bilateral 02/22/2022       Review of patient's allergies indicates:   Allergen Reactions    Amlodipine besylate Swelling    Ace inhibitors      Other reaction(s): Cough    Amlodipine Edema       Outpatient Medications Marked as Taking for the 12/3/24 encounter (Office Visit) with Gladys Hernandez MD   Medication Sig Dispense Refill    doxycycline 50 MG  capsule Take 50 mg by mouth.      fluticasone propionate (FLONASE) 50 mcg/actuation nasal spray 1 spray by Each Nostril route once daily.      loratadine (CLARITIN) 10 mg tablet Take 1 tablet (10 mg total) by mouth once daily. 90 tablet 3    losartan (COZAAR) 100 MG tablet Take 1 tablet (100 mg total) by mouth once daily. 90 tablet 3    pantoprazole (PROTONIX) 40 MG tablet Take 1 tablet (40 mg total) by mouth once daily. 90 tablet 0    pravastatin (PRAVACHOL) 40 MG tablet Take 1 tablet (40 mg total) by mouth once daily. 90 tablet 3    rizatriptan (MAXALT-MLT) 10 MG disintegrating tablet Take 10 mg by mouth.      [DISCONTINUED] busPIRone (BUSPAR) 7.5 MG tablet Take 1 tablet (7.5 mg total) by mouth 2 (two) times daily. 60 tablet 2    [DISCONTINUED] phentermine (ADIPEX-P) 37.5 mg tablet Take 0.5 tablets (18.75 mg total) by mouth 2 (two) times daily with meals. 30 tablet 0       Social History     Socioeconomic History    Marital status: Single    Number of children: 3   Occupational History    Occupation: [employer]:    Tobacco Use    Smoking status: Never    Smokeless tobacco: Never   Substance and Sexual Activity    Alcohol use: Not Currently    Drug use: Never    Sexual activity: Not Currently     Social Drivers of Health     Financial Resource Strain: Low Risk  (7/8/2024)    Overall Financial Resource Strain (CARDIA)     Difficulty of Paying Living Expenses: Not hard at all   Food Insecurity: No Food Insecurity (7/8/2024)    Hunger Vital Sign     Worried About Running Out of Food in the Last Year: Never true     Ran Out of Food in the Last Year: Never true   Transportation Needs: No Transportation Needs (7/8/2024)    TRANSPORTATION NEEDS     Transportation : No   Physical Activity: Insufficiently Active (7/8/2024)    Exercise Vital Sign     Days of Exercise per Week: 2 days     Minutes of Exercise per Session: 30 min   Stress: Stress Concern Present (7/8/2024)    [employer] [place] [employer] -  "Occupational Stress Questionnaire     Feeling of Stress : To some extent   Housing Stability: Low Risk  (7/8/2024)    Housing Stability Vital Sign     Unable to Pay for Housing in the Last Year: No     Homeless in the Last Year: No        Family History   Problem Relation Name Age of Onset    Hypertension Mother      Stroke Father          Subjective:       Review of Systems:    See HPI for details    Constitutional: Denies Change in appetite. Denies Chills. Denies Fever. Denies Night sweats.  Eye: Denies Blurred vision. Denies Discharge. Denies Eye pain.  ENT: Denies Decreased hearing. Denies Sore throat. Denies Swollen glands.  Respiratory: Denies Cough. Denies Shortness of breath. Denies Shortness of breath with exertion. Denies Wheezing.  Cardiovascular: Denies Chest pain at rest. Denies Chest pain with exertion. Denies Irregular heartbeat. Denies Palpitations.  Gastrointestinal: Denies Abdominal pain. Denies Diarrhea. Denies Nausea. Denies Vomiting. Denies Hematemesis or Hematochezia.  Genitourinary: Denies Dysuria. Denies Urinary frequency. Denies Urinary urgency. Denies Blood in urine.  Endocrine: Denies Cold intolerance. Denies Excessive thirst. Denies Heat intolerance. Denies Weight loss. Denies Weight gain.  Musculoskeletal: Denies Painful joints. Denies Weakness.  Integumentary: Denies Rash. Denies Itching. Denies Dry skin.  Neurologic: Denies Dizziness. Denies Fainting. Denies Headache.  Psychiatric: Denies Depression. Reports Anxiety. Denies Suicidal/Homicidal ideations.    All Other ROS: Negative except as stated in HPI.       Objective:     /88 (BP Location: Right arm, Patient Position: Sitting)   Pulse 86   Temp 96.6 °F (35.9 °C) (Oral)   Resp 18   Ht 5' 4" (1.626 m)   Wt 84.7 kg (186 lb 11.2 oz)   SpO2 99%   BMI 32.05 kg/m²     Physical Exam    General: Alert and oriented, No acute distress. Obese.   Head: Normocephalic, Atraumatic.  Eye: Pupils are equal, round and reactive to light, " Extraocular movements are intact, Sclera non-icteric.  Ears/Nose/Throat: Normal, Mucosa moist,Clear.  Neck/Thyroid: Supple, Non-tender, No carotid bruit, No palpable thyromegaly or thyroid nodule, No lymphadenopathy, No JVD, Full range of motion.  Respiratory: Clear to auscultation bilaterally; No wheezes, rales or rhonchi,Non-labored respirations, Symmetrical chest wall expansion.  Cardiovascular: Regular rate and rhythm, S1/S2 normal, No murmurs, rubs or gallops.  Gastrointestinal: Soft, Non-tender, Non-distended, Normal bowel sounds, No palpable organomegaly.  Musculoskeletal: Normal range of motion.  Integumentary: Warm, Dry, Intact, No suspicious lesions or rashes.  Extremities: No clubbing, cyanosis or edema  Neurologic: No focal deficits, Cranial Nerves II-XII are grossly intact, Motor strength normal upper and lower extremities, Sensory exam intact.  Psychiatric: Normal interaction, Coherent speech, Euthymic mood, Appropriate affect         Assessment:       ICD-10-CM ICD-9-CM   1. Class 1 obesity due to excess calories with serious comorbidity and body mass index (BMI) of 32.0 to 32.9 in adult  E66.811 278.00    E66.09 V85.32    Z68.32    2. Generalized anxiety disorder  F41.1 300.02        Plan:     Problem List Items Addressed This Visit          Psychiatric    Generalized anxiety disorder    Relevant Medications    ALPRAZolam (XANAX) 0.25 MG tablet     Other Visit Diagnoses       Class 1 obesity due to excess calories with serious comorbidity and body mass index (BMI) of 32.0 to 32.9 in adult    -  Primary    Relevant Medications    phentermine (ADIPEX-P) 37.5 mg tablet         1. Class 1 obesity due to excess calories with serious comorbidity and body mass index (BMI) of 32.0 to 32.9 in adult  - phentermine (ADIPEX-P) 37.5 mg tablet; Take 0.5 tablets (18.75 mg total) by mouth 2 (two) times daily with meals.  Dispense: 30 tablet; Refill: 0  - Not at goal. Diet, exercise, and 10% weight loss encouraged.  Will prescribe Adipex for this last month, then will try to see if Wegovy is approved patient's formulary for 01/2025.  reviewed today. Will discontinue Adipex and patient to notify M.D. or ER if BP >140/90, chest pain, palpitations, SI/HI with Adipex. Notify M.D. or ER if temp greater than 100.4 or any acute illness.   Body mass index is 32.05 kg/m².  Goal BMI <30.  Exercise 5 times a week for 30 minutes per day.  Avoid soda, simple sugars, excessive rice, potatoes or bread. Limit fast foods and fried foods.  Choose complex carbs in moderation (example: green vegetables, beans, oatmeal). Eat plenty of fresh fruits and vegetables with lean meats daily.  Do not skip meals. Eat a balanced portion size.  Avoid fad diets. Consider permanent healthy life style changes.      2. Generalized anxiety disorder  - ALPRAZolam (XANAX) 0.25 MG tablet; Take 1 tablet (0.25 mg total) by mouth nightly as needed for Anxiety or Insomnia.  Dispense: 30 tablet; Refill: 1  - Discontinue Buspirone due to ineffectiveness. Rx trial of low dose of Xanax 0.25 mg nightly prn and to use only sparingly to help with anxiety.  reviewed today. Controlled Rx agreement was read and signed by patient today. Continue relaxation techniques. Will titrate medication as needed/tolerated. Notify M.D. or ER if symptoms persist or worsen, SI/HI, temp greater than 100.4, or any acute illness.    Start   /  Continue   Practice deep breathing or abdominal breathing exercises when anxiety occurs.  Exercise daily. Get sunlight daily.  Avoid caffeine, alcohol and stimulants.  Practice positive phrases and repeat throughout the day, yoga, lavender scents or Chamomile tea will help anxiety.  Set healthy boundaries, avoid people and conversations that increase stress.  Reports any symptoms of suicidal or homicidal ideations immediately, if clinic is closed go to nearest emergency room.          Kelly was seen today for weight loss.    Diagnoses and all  orders for this visit:    Class 1 obesity due to excess calories with serious comorbidity and body mass index (BMI) of 32.0 to 32.9 in adult  -     phentermine (ADIPEX-P) 37.5 mg tablet; Take 0.5 tablets (18.75 mg total) by mouth 2 (two) times daily with meals.    Generalized anxiety disorder  -     ALPRAZolam (XANAX) 0.25 MG tablet; Take 1 tablet (0.25 mg total) by mouth nightly as needed for Anxiety or Insomnia.          Medication List with Changes/Refills   New Medications    ALPRAZOLAM (XANAX) 0.25 MG TABLET    Take 1 tablet (0.25 mg total) by mouth nightly as needed for Anxiety or Insomnia.       Start Date: 12/3/2024 End Date: 2/1/2025   Current Medications    DOXYCYCLINE 50 MG CAPSULE    Take 50 mg by mouth.       Start Date: 10/17/2024End Date: --    FLUTICASONE PROPIONATE (FLONASE) 50 MCG/ACTUATION NASAL SPRAY    1 spray by Each Nostril route once daily.       Start Date: --        End Date: --    LORATADINE (CLARITIN) 10 MG TABLET    Take 1 tablet (10 mg total) by mouth once daily.       Start Date: 6/17/2024 End Date: --    LOSARTAN (COZAAR) 100 MG TABLET    Take 1 tablet (100 mg total) by mouth once daily.       Start Date: 6/3/2024  End Date: 6/3/2025    PANTOPRAZOLE (PROTONIX) 40 MG TABLET    Take 1 tablet (40 mg total) by mouth once daily.       Start Date: 6/3/2024  End Date: --    PRAVASTATIN (PRAVACHOL) 40 MG TABLET    Take 1 tablet (40 mg total) by mouth once daily.       Start Date: 6/3/2024  End Date: 6/3/2025    RIZATRIPTAN (MAXALT-MLT) 10 MG DISINTEGRATING TABLET    Take 10 mg by mouth.       Start Date: 3/10/2023 End Date: --   Changed and/or Refilled Medications    Modified Medication Previous Medication    PHENTERMINE (ADIPEX-P) 37.5 MG TABLET phentermine (ADIPEX-P) 37.5 mg tablet       Take 0.5 tablets (18.75 mg total) by mouth 2 (two) times daily with meals.    Take 0.5 tablets (18.75 mg total) by mouth 2 (two) times daily with meals.       Start Date: 12/3/2024 End Date: 1/2/2025     Start Date: 11/5/2024 End Date: 12/3/2024   Discontinued Medications    BUSPIRONE (BUSPAR) 7.5 MG TABLET    Take 1 tablet (7.5 mg total) by mouth 2 (two) times daily.       Start Date: 8/27/2024 End Date: 12/3/2024          Follow up in about 30 days (around 1/2/2025) for Obesity Followup, Anxiety followup, In office Followup.

## 2024-12-16 PROBLEM — Z01.818 PRE-OPERATIVE CLEARANCE: Status: ACTIVE | Noted: 2024-12-16

## 2024-12-17 ENCOUNTER — OFFICE VISIT (OUTPATIENT)
Dept: FAMILY MEDICINE | Facility: CLINIC | Age: 54
End: 2024-12-17
Payer: COMMERCIAL

## 2024-12-17 VITALS
WEIGHT: 188.81 LBS | DIASTOLIC BLOOD PRESSURE: 88 MMHG | OXYGEN SATURATION: 98 % | SYSTOLIC BLOOD PRESSURE: 128 MMHG | BODY MASS INDEX: 32.41 KG/M2 | HEART RATE: 93 BPM

## 2024-12-17 DIAGNOSIS — I10 ESSENTIAL HYPERTENSION: ICD-10-CM

## 2024-12-17 DIAGNOSIS — E78.2 MIXED HYPERLIPIDEMIA: ICD-10-CM

## 2024-12-17 DIAGNOSIS — Z01.818 PRE-OPERATIVE CLEARANCE: Primary | ICD-10-CM

## 2024-12-17 PROCEDURE — 99213 OFFICE O/P EST LOW 20 MIN: CPT | Mod: ,,,

## 2024-12-17 PROCEDURE — 3008F BODY MASS INDEX DOCD: CPT | Mod: CPTII,,,

## 2024-12-17 PROCEDURE — 4010F ACE/ARB THERAPY RXD/TAKEN: CPT | Mod: CPTII,,,

## 2024-12-17 PROCEDURE — 3044F HG A1C LEVEL LT 7.0%: CPT | Mod: CPTII,,,

## 2024-12-17 PROCEDURE — G2211 COMPLEX E/M VISIT ADD ON: HCPCS | Mod: ,,,

## 2024-12-17 PROCEDURE — 3074F SYST BP LT 130 MM HG: CPT | Mod: CPTII,,,

## 2024-12-17 PROCEDURE — 1159F MED LIST DOCD IN RCRD: CPT | Mod: CPTII,,,

## 2024-12-17 PROCEDURE — 3079F DIAST BP 80-89 MM HG: CPT | Mod: CPTII,,,

## 2024-12-17 PROCEDURE — 1160F RVW MEDS BY RX/DR IN RCRD: CPT | Mod: CPTII,,,

## 2024-12-17 NOTE — PROGRESS NOTES
Patient ID: [id number]     Chief Complaint: Follow-up (Surgical clearance for thyroid)      Pre-Operative Evaluation:    Risk/Complexity of Surgery:   []  High risk (> 5% MI risk): cardiac and aortic and peripheral vascular surgery   []  Intermediate risk (1-5% MI risk): head and neck (including CEA), intraperitoneal, intrathoracic, orthopedic, prostate   [x]  Low risk (1% MI risk): endoscopic procedures, cataract surgery, breast surgery     Current Medical Problems: Is EKG needed?   [] Active CVD (Unstable Angina, Class III or IV Angina, recent MI in past 30 days, decompensated heart failure, SVT>100, High Grade AV block, mobitz type 2 AV block, symptomatic bradycardia or VT, severe aortic stenosis, symptomatic mitral stenosis)  [] Creatinine > 2.0   [] DM on insulin   [] QT prolonging drugs (I.e - antiarrythmics, antipsychotics/SSRIs, flouroquinolones)  [] Hx of abnormal EKG   [x] HTN   [] No active CVD, creatinine > 2.0, DM on insulin - therefore no ECG required.     Functional Status:  < 4 METs (can do light housework (dishwashing), can walk two blocks on level ground, cannot climb a flight of stairs, walk up a hill, or run)   []  Low risk procedure: no testing required   []  Intermediate/high risk procedure: EKG and stress testing   > 4 METs (can rake leaves, weeding or pushing a power mower, walking up two flights of stairs)  [x] Low/intermediate risk procedure: no testing required   [] High risk procedure: EKG required     Pregnancy: [x]  No     []  Yes  History of anesthesia problems: [x]  No   []  Yes  Smoking status: [x]  Non-smoker   []  Smoker  Social support:  [x]  Yes   []  No    Revised Cardiac Risk Index:   []  High risk surgery   []  History of CVA   []  CHF   []  Creatinine > 2.0   []  DM on insulin   []  Ischemic heart disease   []  Suprainguinal vascular, intrathoracic, or intra-abdominal surgical site     Total Cardiac Risk Index Score:   []  0 - 0.4% risk perioperative cardiac event   [x]  1 -  0.9% risk perioperative cardiac event   []  2 - 6.6% risk perioperative cardiac event   []  3 - 11% risk perioperative cardiac event, perioperative beta blocker for four weeks prior to surgery would be beneficial   []  4 - 11% risk perioperative cardiac event, perioperative beta blocker for four weeks prior to surgery would be beneficial   []  5 - 11% risk perioperative cardiac event, perioperative beta blocker for four weeks prior to surgery would be beneficial     Special Considerations: NA  []  Joint or Heart Valve Replacement: urinalysis needed.   []  Rheumatoid Arthritis history:  cervical spine series for atlantoaxial subluxation needed.   []  LEXI: patient is at risk for LEXI and should be screened prior to procedure; patient is compliant with CPAP and should continue use while asleep in hospital.  []  Balloon Angioplasty: Delay surgery 2-4 weeks post angioplasty + continue ASA perioperatively if possible  []  Bare Metal Stent: Delay surgery 4-6 weeks post procedure and resume ASA perioperatively if possible  []  Drug Eluting Stent: Delay surgery one year post procedure and continue ASA perioperatively      HPI:     Kelly Levin is a 54 y.o. female here today for a surgery clearance.  She is pending a surgical date for parathyroidectomy by Dr. Raul Will ENT. She is a non-smoker. She does not take NSAIDS, ASA.  She denies a family history of sudden cardiac death.  She denies allergies or previous reaction to anethesia. She denies fever, chills, chest pain, palpitations, shortness of breath, nausea. Blood pressure is well-controlled with current Rx.  She denies medication side effects.  Cholesterol is well-controlled with current Rx.  She denies myalgias, abdominal pain.  She has no other complaints and she denies the need for medication refills today.     Past Medical History:   Diagnosis Date    Essential hypertension 04/27/2023    Gastric reflux 04/27/2023    Menstrual migraine without status  migrainosus, not intractable 04/27/2023    Mixed hyperlipidemia 04/27/2023    Seasonal allergies 04/27/2023        Past Surgical History:   Procedure Laterality Date    COLONOSCOPY  04/20/2021    REDUCTION OF BOTH BREASTS Bilateral 02/22/2022        Social History     Socioeconomic History    Marital status: Single    Number of children: 3   Occupational History    Occupation: [employer]:    Tobacco Use    Smoking status: Never    Smokeless tobacco: Never   Substance and Sexual Activity    Alcohol use: Not Currently    Drug use: Never    Sexual activity: Not Currently     Social Drivers of Health     Financial Resource Strain: Low Risk  (7/8/2024)    Overall Financial Resource Strain (CARDIA)     Difficulty of Paying Living Expenses: Not hard at all   Food Insecurity: No Food Insecurity (7/8/2024)    Hunger Vital Sign     Worried About Running Out of Food in the Last Year: Never true     Ran Out of Food in the Last Year: Never true   Transportation Needs: No Transportation Needs (7/8/2024)    TRANSPORTATION NEEDS     Transportation : No   Physical Activity: Insufficiently Active (7/8/2024)    Exercise Vital Sign     Days of Exercise per Week: 2 days     Minutes of Exercise per Session: 30 min   Stress: Stress Concern Present (7/8/2024)    [employer] - Occupational Stress Questionnaire     Feeling of Stress : To some extent   Housing Stability: Low Risk  (7/8/2024)    Housing Stability Vital Sign     Unable to Pay for Housing in the Last Year: No     Homeless in the Last Year: No        Current Outpatient Medications   Medication Instructions    ALPRAZolam (XANAX) 0.25 mg, Oral, Nightly PRN    doxycycline 50 mg    fluticasone propionate (FLONASE) 50 mcg/actuation nasal spray 1 spray, Daily    loratadine (CLARITIN) 10 mg, Oral, Daily    losartan (COZAAR) 100 mg, Oral, Daily    pantoprazole (PROTONIX) 40 mg, Oral, Daily    phentermine (ADIPEX-P) 18.75 mg, Oral, 2 times daily with  meals    pravastatin (PRAVACHOL) 40 mg, Oral, Daily    rizatriptan (MAXALT-MLT) 10 mg       Review of patient's allergies indicates:   Allergen Reactions    Amlodipine besylate Swelling    Ace inhibitors      Other reaction(s): Cough    Amlodipine Edema        Patient Care Team:  Gladys Hernandez MD as PCP - General (Family Medicine)  Tammie Dawn MD as Consulting Physician (Obstetrics and Gynecology)  JUDITH Bell MD as Consulting Physician (Gastroenterology)  Raul Will Jr., MD as Consulting Physician (Otolaryngology)     Subjective:     ROS    See HPI for details    Constitutional: Denies Change in appetite. Denies Chills. Denies Fever. Denies Night sweats.  Eye: Denies Blurred vision. Denies Discharge. Denies Eye pain.  ENT: Denies Decreased hearing. Denies Sore throat. Denies Swollen glands.  Respiratory: Denies Cough. Denies Shortness of breath. Denies Shortness of breath with exertion. Denies Wheezing.  Cardiovascular: DeniesChest pain at rest. Denies Chest pain with exertion. Denies Irregular heartbeat. Denies Palpitations.  Gastrointestinal: Denies Abdominal pain. DeniesDiarrhea. Denies Nausea. Denies Vomiting. Denies Hematemesis or Hematochezia.  Genitourinary: Denies Dysuria. Denies Urinary frequency. Denies Urinary urgency. Denies Blood in urine.  Endocrine: Denies Cold intolerance. Denies Excessive thirst. Denies Heat intolerance. Denies Weight loss. Denies Weight gain.  Musculoskeletal: Denies Painful joints. Denies Weakness.  Integumentary: Denies Rash. Denies Itching. Denies Dry skin.  Neurologic: Denies Dizziness. Denies Fainting. Denies Headache.  Psychiatric: Denies Depression. Denies Anxiety. Denies Suicidal/Homicidal ideations.    All Other ROS: Negative except as stated in HPI.       Objective:     Visit Vitals  /88 (BP Location: Left arm, Patient Position: Sitting)   Pulse 93   Wt 85.6 kg (188 lb 12.8 oz)   SpO2 98%   BMI 32.41 kg/m²       Physical  Exam    General: Alert and oriented, No acute distress.  Head: Normocephalic, Atraumatic.  Eye: Pupils are equal, round and reactive to light, Extraocular movements are intact, Sclera non-icteric.  Ears/Nose/Throat: Normal, Mucosa moist,Clear.  Neck/Thyroid: Supple, Non-tender, No carotid bruit, No palpable thyromegaly or thyroid nodule, No lymphadenopathy, No JVD, Full range of motion.  Respiratory: Clear to auscultation bilaterally; No wheezes, rales or rhonchi,Non-labored respirations, Symmetrical chest wall expansion.  Cardiovascular: Regular rate and rhythm, S1/S2 normal, No murmurs, rubs or gallops.  Gastrointestinal: Soft, Non-tender, Non-distended, Normal bowel sounds, No palpable organomegaly.  Musculoskeletal: Normal range of motion.  Integumentary: Warm, Dry, Intact, No suspicious lesions or rashes.  Extremities: No clubbing, cyanosis or edema  Neurologic: No focal deficits, Cranial Nerves II-XII are grossly intact, Motor strength normal upper and lower extremities, Sensory exam intact.  Psychiatric: Normal interaction, Coherent speech, Euthymic mood, Appropriate affect       Assessment:       ICD-10-CM ICD-9-CM   1. Pre-operative clearance  Z01.818 V72.84   2. Essential hypertension  I10 401.9   3. Mixed hyperlipidemia  E78.2 272.2        Plan:     1. Pre-operative clearance  Assessment & Plan:  Will determine clearance pending results.     Orders:  -     CBC Auto Differential; Future; Expected date: 12/17/2024  -     Comprehensive Metabolic Panel; Future; Expected date: 12/17/2024  -     Protime-INR; Future; Expected date: 12/17/2024  -     APTT; Future; Expected date: 12/17/2024  -     SCHEDULED EKG 12-LEAD (to Muse); Future    2. Essential hypertension  Assessment & Plan:  BP is at goal. Continue BP medications as prescribed. Will titrate BP Rx until BP is <140/90   Monitor blood pressure daily and log. Report consistent numbers greater than 138/88.  Stressed low sodium diet (DASH Diet - Less than 2  grams of sodium per day).  Goal BMI <30. Exercise 30 minutes per day, 5 days per week.   Notify M.D. or ER if BP >170/100 or <90/60, chest pain, palpitations, headache, SOB, temp greater than 100.4, or any acute illness.      3. Mixed hyperlipidemia  Assessment & Plan:  Continue pravastatin as prescribed every evening.  Notify the provider if you experience abdominal pain, muscle aches or cramps.  Stressed following a low cholesterol, low fat diet. Avoid fried foods and high saturated fats.  Increase fiber intake. Foods high in soluble fiber, such as oats, beans, lentils, fruits, and vegetables, can help lower LDL cholesterol levels.   Exercise/walk 5 times per week for 30 minutes a day. Regular physical activity can help raise HDL (high-density lipoprotein) cholesterol and lower LDL cholesterol.              Preoperative Medication Adjustment  []  Plavix/Brilinta - Stop 5-7 days prior to surgery, resume with PO intake.   [x]  NSAIDs - Stop 7 days prior.   []  Pradaxa - Stop 2-5 days prior, resume with PO intake.   []  Xarelto - Stop at least 24 hrs prior, resume with PO intake.   []  Coumadin Low Thromboembolic Risk (Afib with no embolism in 12 mo, biologic heart valves > 3 mo ago) - Stop 5 days pre-op and restart post-op.   []  Coumadin High Thromboembolic Risk (mechanical heart valve, VTE with hypercoagulable state or < 3 mo ago) - Stop 4 days pre-op and start LMWH, stop LMWH 12-18h pre-op, restart LMWH 6h post-op, restart warfarin with PO intake, stop LMWH when INR = 2.0.   []  Insulin - Continue BASAL insulin at 1/2 dose, hold short-acting and regular insulin until eating again.   []  Oral Hypoglycemics: Hold on day of surgery; hold metformin 48 hours preoperatively.  [x]  AntiHTN: Hold diuretic, hold ace-inhibitors, hold arbs, continue others.  []  Sedative: Hold 24 hours preoperatively.  []  Lithium: Check serum level, hold on day of surgery.  []  Levodopa/Carbidopa: Hold on day of surgery.  []  Estrogen:  Hold on day of surgery; resume when patient ambulates.    Risk for surgical site infection secondary to:   []  Smoking   []  Diabetes    [x]  Obesity   []  Malnutrition   []  Chronic Skin Disease       In addition to their scheduled follow up, the patient has also been instructed to follow up on as needed basis.     Future Appointments   Date Time Provider Department Center   1/7/2025  3:45 PM Gladys Hernandez MD [facility] [place]    7/9/2025  8:30 AM [facility]

## 2024-12-18 ENCOUNTER — TELEPHONE (OUTPATIENT)
Dept: FAMILY MEDICINE | Facility: CLINIC | Age: 54
End: 2024-12-18
Payer: COMMERCIAL

## 2024-12-18 NOTE — TELEPHONE ENCOUNTER
I voiced to pt that her clearance papers for surgery came back and voiced to her that she has to do her blood work. Pt voiced understanding and thanks!

## 2024-12-19 NOTE — TELEPHONE ENCOUNTER
----- Message from ITALIA Banerjee sent at 12/18/2024  8:23 AM CST -----  Please let the patient know she has an EKG ordered for her preop clearance.  Thank you.

## 2024-12-19 NOTE — TELEPHONE ENCOUNTER
I voiced to pt that she has to do her EKG for her surgery clearance. Pt voiced understanding and thanks.

## 2024-12-23 ENCOUNTER — CLINICAL SUPPORT (OUTPATIENT)
Dept: RESPIRATORY THERAPY | Facility: HOSPITAL | Age: 54
End: 2024-12-23
Payer: COMMERCIAL

## 2024-12-23 ENCOUNTER — TELEPHONE (OUTPATIENT)
Dept: FAMILY MEDICINE | Facility: CLINIC | Age: 54
End: 2024-12-23
Payer: COMMERCIAL

## 2024-12-23 ENCOUNTER — LAB VISIT (OUTPATIENT)
Dept: LAB | Facility: HOSPITAL | Age: 54
End: 2024-12-23
Attending: FAMILY MEDICINE
Payer: COMMERCIAL

## 2024-12-23 DIAGNOSIS — Z01.818 PRE-OPERATIVE CLEARANCE: ICD-10-CM

## 2024-12-23 DIAGNOSIS — F41.1 GENERALIZED ANXIETY DISORDER: ICD-10-CM

## 2024-12-23 DIAGNOSIS — E66.09 CLASS 1 OBESITY DUE TO EXCESS CALORIES WITH SERIOUS COMORBIDITY AND BODY MASS INDEX (BMI) OF 33.0 TO 33.9 IN ADULT: ICD-10-CM

## 2024-12-23 DIAGNOSIS — E66.811 CLASS 1 OBESITY DUE TO EXCESS CALORIES WITH SERIOUS COMORBIDITY AND BODY MASS INDEX (BMI) OF 33.0 TO 33.9 IN ADULT: ICD-10-CM

## 2024-12-23 LAB
ALBUMIN SERPL-MCNC: 4.2 G/DL (ref 3.5–5)
ALBUMIN/GLOB SERPL: 1.3 RATIO (ref 1.1–2)
ALP SERPL-CCNC: 86 UNIT/L (ref 40–150)
ALT SERPL-CCNC: 21 UNIT/L (ref 0–55)
ANION GAP SERPL CALC-SCNC: 7 MEQ/L
APTT PPP: 29.7 SECONDS (ref 23.4–33.9)
AST SERPL-CCNC: 22 UNIT/L (ref 5–34)
BASOPHILS # BLD AUTO: 0.09 X10(3)/MCL
BASOPHILS NFR BLD AUTO: 1.9 %
BILIRUB SERPL-MCNC: 0.4 MG/DL
BUN SERPL-MCNC: 16.2 MG/DL (ref 9.8–20.1)
CALCIUM SERPL-MCNC: 10.4 MG/DL (ref 8.4–10.2)
CHLORIDE SERPL-SCNC: 104 MMOL/L (ref 98–107)
CO2 SERPL-SCNC: 29 MMOL/L (ref 22–29)
CREAT SERPL-MCNC: 0.92 MG/DL (ref 0.55–1.02)
CREAT/UREA NIT SERPL: 18
EOSINOPHIL # BLD AUTO: 0.2 X10(3)/MCL (ref 0–0.9)
EOSINOPHIL NFR BLD AUTO: 4.2 %
ERYTHROCYTE [DISTWIDTH] IN BLOOD BY AUTOMATED COUNT: 12.6 % (ref 11.5–17)
GFR SERPLBLD CREATININE-BSD FMLA CKD-EPI: >60 ML/MIN/1.73/M2
GLOBULIN SER-MCNC: 3.2 GM/DL (ref 2.4–3.5)
GLUCOSE SERPL-MCNC: 111 MG/DL (ref 74–100)
HCT VFR BLD AUTO: 41.8 % (ref 37–47)
HGB BLD-MCNC: 13.8 G/DL (ref 12–16)
IMM GRANULOCYTES # BLD AUTO: 0.01 X10(3)/MCL (ref 0–0.04)
IMM GRANULOCYTES NFR BLD AUTO: 0.2 %
INR PPP: 0.9 (ref 2–3)
LYMPHOCYTES # BLD AUTO: 1.9 X10(3)/MCL (ref 0.6–4.6)
LYMPHOCYTES NFR BLD AUTO: 40.3 %
MCH RBC QN AUTO: 29.1 PG (ref 27–31)
MCHC RBC AUTO-ENTMCNC: 33 G/DL (ref 33–36)
MCV RBC AUTO: 88 FL (ref 80–94)
MONOCYTES # BLD AUTO: 0.45 X10(3)/MCL (ref 0.1–1.3)
MONOCYTES NFR BLD AUTO: 9.6 %
NEUTROPHILS # BLD AUTO: 2.06 X10(3)/MCL (ref 2.1–9.2)
NEUTROPHILS NFR BLD AUTO: 43.8 %
NRBC BLD AUTO-RTO: 0 %
OHS QRS DURATION: 76 MS
OHS QTC CALCULATION: 406 MS
PLATELET # BLD AUTO: 230 X10(3)/MCL (ref 130–400)
PLATELETS.RETICULATED NFR BLD AUTO: 7.7 % (ref 0.9–11.2)
PMV BLD AUTO: 10.1 FL (ref 7.4–10.4)
POTASSIUM SERPL-SCNC: 4.4 MMOL/L (ref 3.5–5.1)
PROT SERPL-MCNC: 7.4 GM/DL (ref 6.4–8.3)
PROTHROMBIN TIME: 12.5 SECONDS (ref 11.7–14.5)
RBC # BLD AUTO: 4.75 X10(6)/MCL (ref 4.2–5.4)
SODIUM SERPL-SCNC: 140 MMOL/L (ref 136–145)
T4 FREE SERPL-MCNC: 1.01 NG/DL (ref 0.7–1.48)
TSH SERPL-ACNC: 0.54 UIU/ML (ref 0.35–4.94)
WBC # BLD AUTO: 4.71 X10(3)/MCL (ref 4.5–11.5)

## 2024-12-23 PROCEDURE — 84443 ASSAY THYROID STIM HORMONE: CPT

## 2024-12-23 PROCEDURE — 85025 COMPLETE CBC W/AUTO DIFF WBC: CPT

## 2024-12-23 PROCEDURE — 36415 COLL VENOUS BLD VENIPUNCTURE: CPT

## 2024-12-23 PROCEDURE — 84439 ASSAY OF FREE THYROXINE: CPT

## 2024-12-23 PROCEDURE — 80053 COMPREHEN METABOLIC PANEL: CPT

## 2024-12-23 PROCEDURE — 85610 PROTHROMBIN TIME: CPT

## 2024-12-23 PROCEDURE — 93005 ELECTROCARDIOGRAM TRACING: CPT

## 2024-12-23 PROCEDURE — 85730 THROMBOPLASTIN TIME PARTIAL: CPT

## 2024-12-23 PROCEDURE — 93010 ELECTROCARDIOGRAM REPORT: CPT | Mod: ,,, | Performed by: STUDENT IN AN ORGANIZED HEALTH CARE EDUCATION/TRAINING PROGRAM

## 2024-12-23 NOTE — TELEPHONE ENCOUNTER
----- Message from ITALIA Banerjee sent at 12/23/2024  2:21 PM CST -----  The patient is cleared for procedure. Hold NSAIDS, ASA 7 days before procedure. Hold BP medication, adipex and xanax the day of the procedure. Resume medications when tolerating PO intake.

## 2025-01-05 DIAGNOSIS — K21.9 GASTRIC REFLUX: ICD-10-CM

## 2025-01-06 RX ORDER — PANTOPRAZOLE SODIUM 40 MG/1
40 TABLET, DELAYED RELEASE ORAL
Qty: 90 TABLET | Refills: 0 | Status: SHIPPED | OUTPATIENT
Start: 2025-01-06 | End: 2025-01-10

## 2025-01-07 ENCOUNTER — OFFICE VISIT (OUTPATIENT)
Dept: FAMILY MEDICINE | Facility: CLINIC | Age: 55
End: 2025-01-07
Payer: COMMERCIAL

## 2025-01-07 VITALS
SYSTOLIC BLOOD PRESSURE: 124 MMHG | HEIGHT: 64 IN | HEART RATE: 69 BPM | WEIGHT: 187 LBS | TEMPERATURE: 99 F | RESPIRATION RATE: 16 BRPM | OXYGEN SATURATION: 99 % | BODY MASS INDEX: 31.92 KG/M2 | DIASTOLIC BLOOD PRESSURE: 88 MMHG

## 2025-01-07 DIAGNOSIS — E66.09 CLASS 1 OBESITY DUE TO EXCESS CALORIES WITH SERIOUS COMORBIDITY AND BODY MASS INDEX (BMI) OF 32.0 TO 32.9 IN ADULT: ICD-10-CM

## 2025-01-07 DIAGNOSIS — F41.1 GENERALIZED ANXIETY DISORDER: Primary | ICD-10-CM

## 2025-01-07 DIAGNOSIS — E66.811 CLASS 1 OBESITY DUE TO EXCESS CALORIES WITH SERIOUS COMORBIDITY AND BODY MASS INDEX (BMI) OF 32.0 TO 32.9 IN ADULT: ICD-10-CM

## 2025-01-07 NOTE — PROGRESS NOTES
Patient ID: [id number]     Chief Complaint: Weight Loss        HPI:     Kelly Levin is a 54 y.o. female here today for a follow up anxiety.  - She is here for followup anxiety. Anxiety is controlled with Xanax p.r.n., she takes Rx Xanax sparingly, no side effects. No recent acute stressors.  Symptoms include  feeling nervous, anxious, on edge, not being able to stop control worrying, trouble relaxing, mind racing at night, becoming easily annoyed or irritable -which then leads to sadness, tearfulness-being overwhelmed.  Stressors and coping mechanisms discussed at length. She has tried Lexapro with drowsiness and she stopped taking the Lexapro several months ago. She has tried taking Buspirone 7.5 mg x bid without improvement of symptoms. She denies depression, SI/HI, or AH/VH. She is not interested in outpatient counseling.   - She is obese, lost 1 pound since last visit, she has completed 3 month course of Adipex, working on losing weight on her own, she is not interested in trying a different Rx or seeing a dietician at this time, she is scheduled for parathyroidectomy on 02/05/2025 with ENT (Dr. Raul Will).   - Patient is without any other complaints today.           -------------------------------------    Essential hypertension    Gastric reflux    Menstrual migraine without status migrainosus, not intractable    Mixed hyperlipidemia    Seasonal allergies        Past Surgical History:   Procedure Laterality Date    COLONOSCOPY  04/20/2021    REDUCTION OF BOTH BREASTS Bilateral 02/22/2022       Review of patient's allergies indicates:   Allergen Reactions    Amlodipine besylate Swelling    Ace inhibitors      Other reaction(s): Cough    Amlodipine Edema       Outpatient Medications Marked as Taking for the 1/7/25 encounter (Office Visit) with Gladys Hernandez MD   Medication Sig Dispense Refill    ALPRAZolam (XANAX) 0.25 MG tablet Take 1 tablet (0.25 mg total) by mouth nightly as needed for  Anxiety or Insomnia. 30 tablet 1    doxycycline 50 MG capsule Take 50 mg by mouth.      fluticasone propionate (FLONASE) 50 mcg/actuation nasal spray 1 spray by Each Nostril route once daily.      loratadine (CLARITIN) 10 mg tablet Take 1 tablet (10 mg total) by mouth once daily. 90 tablet 3    losartan (COZAAR) 100 MG tablet Take 1 tablet (100 mg total) by mouth once daily. 90 tablet 3    pantoprazole (PROTONIX) 40 MG tablet Take 1 tablet by mouth once daily 90 tablet 0    pravastatin (PRAVACHOL) 40 MG tablet Take 1 tablet (40 mg total) by mouth once daily. 90 tablet 3    rizatriptan (MAXALT-MLT) 10 MG disintegrating tablet Take 10 mg by mouth.         Social History     Socioeconomic History    Marital status: Single    Number of children: 3   Occupational History    Occupation: [employer]:    Tobacco Use    Smoking status: Never    Smokeless tobacco: Never   Substance and Sexual Activity    Alcohol use: Not Currently    Drug use: Never    Sexual activity: Not Currently     Social Drivers of Health     Financial Resource Strain: Low Risk  (7/8/2024)    Overall Financial Resource Strain (CARDIA)     Difficulty of Paying Living Expenses: Not hard at all   Food Insecurity: No Food Insecurity (7/8/2024)    Hunger Vital Sign     Worried About Running Out of Food in the Last Year: Never true     Ran Out of Food in the Last Year: Never true   Transportation Needs: No Transportation Needs (7/8/2024)    TRANSPORTATION NEEDS     Transportation : No   Physical Activity: Insufficiently Active (7/8/2024)    Exercise Vital Sign     Days of Exercise per Week: 2 days     Minutes of Exercise per Session: 30 min   Stress: Stress Concern Present (7/8/2024)    [employer] - Occupational Stress Questionnaire     Feeling of Stress : To some extent   Housing Stability: Low Risk  (7/8/2024)    Housing Stability Vital Sign     Unable to Pay for Housing in the Last Year: No     Homeless in the Last Year:  "No        Family History   Problem Relation Name Age of Onset    Hypertension Mother      Stroke Father          Subjective:       Review of Systems:    See HPI for details    Constitutional: Denies Change in appetite. Denies Chills. Denies Fever. Denies Night sweats.  Eye: Denies Blurred vision. Denies Discharge. Denies Eye pain.  ENT: Denies Decreased hearing. Denies Sore throat. Denies Swollen glands.  Respiratory: Denies Cough. Denies Shortness of breath. Denies Shortness of breath with exertion. Denies Wheezing.  Cardiovascular: Denies Chest pain at rest. Denies Chest pain with exertion. Denies Irregular heartbeat. Denies Palpitations.  Gastrointestinal: Denies Abdominal pain. Denies Diarrhea. Denies Nausea. Denies Vomiting. Denies Hematemesis or Hematochezia.  Genitourinary: Denies Dysuria. Denies Urinary frequency. Denies Urinary urgency. Denies Blood in urine.  Endocrine: Denies Cold intolerance. Denies Excessive thirst. Denies Heat intolerance. Denies Weight loss. Denies Weight gain.  Musculoskeletal: Denies Painful joints. Denies Weakness.  Integumentary: Denies Rash. Denies Itching. Denies Dry skin.  Neurologic: Denies Dizziness. Denies Fainting. Denies Headache.  Psychiatric: Denies Depression. Reports Anxiety. Denies Suicidal/Homicidal ideations.    All Other ROS: Negative except as stated in HPI.       Objective:     /88 (BP Location: Right arm, Patient Position: Sitting)   Pulse 69   Temp 98.5 °F (36.9 °C) (Oral)   Resp 16   Ht 5' 4" (1.626 m)   Wt 84.8 kg (187 lb)   SpO2 99%   BMI 32.10 kg/m²     Physical Exam    General: Alert and oriented, No acute distress. Obese.   Head: Normocephalic, Atraumatic.  Eye: Pupils are equal, round and reactive to light, Extraocular movements are intact, Sclera non-icteric.  Ears/Nose/Throat: Normal, Mucosa moist,Clear.  Neck/Thyroid: Supple, Non-tender, No carotid bruit, No palpable thyromegaly or thyroid nodule, No lymphadenopathy, No JVD, Full range of " motion.  Respiratory: Clear to auscultation bilaterally; No wheezes, rales or rhonchi,Non-labored respirations, Symmetrical chest wall expansion.  Cardiovascular: Regular rate and rhythm, S1/S2 normal, No murmurs, rubs or gallops.  Gastrointestinal: Soft, Non-tender, Non-distended, Normal bowel sounds, No palpable organomegaly.  Musculoskeletal: Normal range of motion.  Integumentary: Warm, Dry, Intact, No suspicious lesions or rashes.  Extremities: No clubbing, cyanosis or edema  Neurologic: No focal deficits, Cranial Nerves II-XII are grossly intact, Motor strength normal upper and lower extremities, Sensory exam intact.  Psychiatric: Normal interaction, Coherent speech, Euthymic mood, Appropriate affect         Assessment:       ICD-10-CM ICD-9-CM   1. Generalized anxiety disorder  F41.1 300.02   2. Class 1 obesity due to excess calories with serious comorbidity and body mass index (BMI) of 32.0 to 32.9 in adult  E66.811 278.00    E66.09 V85.32    Z68.32         Plan:     Problem List Items Addressed This Visit          Psychiatric    Generalized anxiety disorder - Primary     Other Visit Diagnoses       Class 1 obesity due to excess calories with serious comorbidity and body mass index (BMI) of 32.0 to 32.9 in adult             1. Generalized anxiety disorder  - Continue Rx low dose of Xanax 0.25 mg nightly prn and to use only sparingly to help with anxiety.  reviewed today. Continue relaxation techniques. Will titrate medication as needed/tolerated. Notify M.D. or ER if symptoms persist or worsen, SI/HI, temp greater than 100.4, or any acute illness.    Start   /  Continue   Practice deep breathing or abdominal breathing exercises when anxiety occurs.  Exercise daily. Get sunlight daily.  Avoid caffeine, alcohol and stimulants.  Practice positive phrases and repeat throughout the day, yoga, lavender scents or Chamomile tea will help anxiety.  Set healthy boundaries, avoid people and conversations that  increase stress.  Reports any symptoms of suicidal or homicidal ideations immediately, if clinic is closed go to nearest emergency room.     2. Class 1 obesity due to excess calories with serious comorbidity and body mass index (BMI) of 32.0 to 32.9 in adult  - Improving, continue lifestyle modifications for now, patient to consider trial of Qsymia.   Body mass index is 32.1 kg/m².  Goal BMI <30.  Exercise 5 times a week for 30 minutes per day.  Avoid soda, simple sugars, excessive rice, potatoes or bread. Limit fast foods and fried foods.  Choose complex carbs in moderation (example: green vegetables, beans, oatmeal). Eat plenty of fresh fruits and vegetables with lean meats daily.  Do not skip meals. Eat a balanced portion size.  Avoid fad diets. Consider permanent healthy life style changes.        Kelly was seen today for weight loss.    Diagnoses and all orders for this visit:    Generalized anxiety disorder    Class 1 obesity due to excess calories with serious comorbidity and body mass index (BMI) of 32.0 to 32.9 in adult          Medication List with Changes/Refills   Current Medications    ALPRAZOLAM (XANAX) 0.25 MG TABLET    Take 1 tablet (0.25 mg total) by mouth nightly as needed for Anxiety or Insomnia.       Start Date: 12/3/2024 End Date: 2/1/2025    DOXYCYCLINE 50 MG CAPSULE    Take 50 mg by mouth.       Start Date: 10/17/2024End Date: --    FLUTICASONE PROPIONATE (FLONASE) 50 MCG/ACTUATION NASAL SPRAY    1 spray by Each Nostril route once daily.       Start Date: --        End Date: --    LORATADINE (CLARITIN) 10 MG TABLET    Take 1 tablet (10 mg total) by mouth once daily.       Start Date: 6/17/2024 End Date: --    LOSARTAN (COZAAR) 100 MG TABLET    Take 1 tablet (100 mg total) by mouth once daily.       Start Date: 6/3/2024  End Date: 6/3/2025    PANTOPRAZOLE (PROTONIX) 40 MG TABLET    Take 1 tablet by mouth once daily       Start Date: 1/6/2025  End Date: --    PRAVASTATIN (PRAVACHOL) 40 MG  TABLET    Take 1 tablet (40 mg total) by mouth once daily.       Start Date: 6/3/2024  End Date: 6/3/2025    RIZATRIPTAN (MAXALT-MLT) 10 MG DISINTEGRATING TABLET    Take 10 mg by mouth.       Start Date: 3/10/2023 End Date: --   Discontinued Medications    PHENTERMINE (ADIPEX-P) 37.5 MG TABLET    Take 0.5 tablets (18.75 mg total) by mouth 2 (two) times daily with meals.       Start Date: 12/3/2024 End Date: 1/7/2025          Follow up in about 3 months (around 4/7/2025) for Obesity Followup, Anxiety followup, In office Followup.

## 2025-01-10 DIAGNOSIS — K21.9 GASTRIC REFLUX: ICD-10-CM

## 2025-01-10 RX ORDER — ESOMEPRAZOLE MAGNESIUM 40 MG/1
40 CAPSULE, DELAYED RELEASE ORAL
Qty: 30 CAPSULE | Refills: 11 | Status: SHIPPED | OUTPATIENT
Start: 2025-01-10 | End: 2026-01-10

## 2025-01-16 ENCOUNTER — TELEPHONE (OUTPATIENT)
Dept: FAMILY MEDICINE | Facility: CLINIC | Age: 55
End: 2025-01-16
Payer: COMMERCIAL

## 2025-01-16 ENCOUNTER — E-VISIT (OUTPATIENT)
Dept: FAMILY MEDICINE | Facility: CLINIC | Age: 55
End: 2025-01-16
Payer: COMMERCIAL

## 2025-01-16 DIAGNOSIS — J06.9 UPPER RESPIRATORY TRACT INFECTION, UNSPECIFIED TYPE: Primary | ICD-10-CM

## 2025-01-16 PROCEDURE — 99421 OL DIG E/M SVC 5-10 MIN: CPT | Mod: ,,, | Performed by: FAMILY MEDICINE

## 2025-01-16 RX ORDER — AZITHROMYCIN 250 MG/1
TABLET, FILM COATED ORAL
Qty: 6 TABLET | Refills: 0 | Status: SHIPPED | OUTPATIENT
Start: 2025-01-16 | End: 2025-01-21

## 2025-01-16 NOTE — PROGRESS NOTES
Patient ID: Kelly Levin is a 54 y.o. female.    Chief Complaint: URI (Entered automatically based on patient selection in [employer].)    The patient initiated a request through [employer] on 1/16/2025 for evaluation and management with a chief complaint of URI (Entered automatically based on patient selection in [employer].)     I evaluated the questionnaire submission on 01/16/2025.    [facility] P[facility] E-Visit Covid    1/16/2025  1:03 PM CST - Filed by Patient   Do you agree to participate in an E-Visit? Yes   If you have any of the following symptoms, go to your local emergency room or call 911: I acknowledge   Choose the state of your primary residence [place]   Do you have any of the following pregnancy-related conditions? None   What is the main issue you would like addressed today? Allergies. The medicine im on is not working i have a flare up needing a zpk   Do you think you might have COVID or the Flu? No   Have you tested positive for COVID or Flu? No   What symptoms do you currently have?  None of these   Have you ever smoked? I have never smoked   Have you had a fever? No   When did your symptoms first appear? 1/15/2025   In the last two weeks, have you been in close contact with someone who has COVID-19 or the Flu? No   List what you have done or taken to help your symptoms. Claitin, flonase   How severe are your symptoms? Moderate   Have your symptoms gotten better or worse since they started?  Worse   Do you have transportation to get testing if it is needed and ordered for you at an [facility] location? Yes   Provide any additional information you feel is important. Its from dust being removed over my head on ceiling at work is when it started   Please attach any relevant images or files    Are you able to take your vital signs? No         Encounter Diagnosis   Name Primary?    Upper respiratory tract infection, unspecified type Yes        No orders of the defined types were placed in this encounter.      Medications Ordered This Encounter   Medications    azithromycin (Z-TIP) 250 MG tablet     Sig: Take 2 tablets by mouth on day 1; Take 1 tablet by mouth on days 2-5     Dispense:  6 tablet     Refill:  0      Increase fluid intake. I sent an antibiotic (Z-tip) as requested to help her symptoms. Continue Claritin and Flonase as prescribed.. If symptoms are refractory to treatment, will proceed with obtaining a CT sinus and ENT referral for allergy testing. Notify M.D. or ER if symptoms persist or worsen, shortness of breath, chest pain, coughing up blood, temp >100.4, or any acute illness.        Follow up if symptoms worsen or fail to improve.      E-Visit Time Tracking:    Day 1 Time (in minutes): 5    Total Time (in minutes): 5

## 2025-01-24 DIAGNOSIS — K21.9 GASTRIC REFLUX: ICD-10-CM

## 2025-01-24 RX ORDER — ESOMEPRAZOLE MAGNESIUM 40 MG/1
40 CAPSULE, DELAYED RELEASE ORAL
Qty: 30 CAPSULE | Refills: 0 | Status: SHIPPED | OUTPATIENT
Start: 2025-01-24 | End: 2026-01-24

## 2025-04-03 ENCOUNTER — TELEPHONE (OUTPATIENT)
Dept: FAMILY MEDICINE | Facility: CLINIC | Age: 55
End: 2025-04-03
Payer: COMMERCIAL

## 2025-04-09 ENCOUNTER — OFFICE VISIT (OUTPATIENT)
Dept: FAMILY MEDICINE | Facility: CLINIC | Age: 55
End: 2025-04-09
Payer: COMMERCIAL

## 2025-04-09 VITALS
HEART RATE: 78 BPM | TEMPERATURE: 98 F | BODY MASS INDEX: 34.66 KG/M2 | RESPIRATION RATE: 18 BRPM | SYSTOLIC BLOOD PRESSURE: 128 MMHG | OXYGEN SATURATION: 98 % | WEIGHT: 203 LBS | DIASTOLIC BLOOD PRESSURE: 85 MMHG | HEIGHT: 64 IN

## 2025-04-09 DIAGNOSIS — F41.1 GENERALIZED ANXIETY DISORDER: Primary | ICD-10-CM

## 2025-04-09 DIAGNOSIS — E66.811 CLASS 1 OBESITY DUE TO EXCESS CALORIES WITH SERIOUS COMORBIDITY AND BODY MASS INDEX (BMI) OF 34.0 TO 34.9 IN ADULT: ICD-10-CM

## 2025-04-09 DIAGNOSIS — K21.9 GASTRIC REFLUX: ICD-10-CM

## 2025-04-09 DIAGNOSIS — E66.09 CLASS 1 OBESITY DUE TO EXCESS CALORIES WITH SERIOUS COMORBIDITY AND BODY MASS INDEX (BMI) OF 34.0 TO 34.9 IN ADULT: ICD-10-CM

## 2025-04-09 RX ORDER — ALPRAZOLAM 0.25 MG/1
0.25 TABLET ORAL NIGHTLY PRN
Qty: 30 TABLET | Refills: 2 | Status: SHIPPED | OUTPATIENT
Start: 2025-04-09 | End: 2025-07-08

## 2025-04-09 RX ORDER — SEMAGLUTIDE 0.25 MG/.5ML
0.25 INJECTION, SOLUTION SUBCUTANEOUS
Qty: 2 ML | Refills: 1 | COMMUNITY
Start: 2025-04-09 | End: 2025-06-08

## 2025-04-09 RX ORDER — PANTOPRAZOLE SODIUM 40 MG/1
40 TABLET, DELAYED RELEASE ORAL DAILY
Qty: 90 TABLET | Refills: 3 | Status: SHIPPED | OUTPATIENT
Start: 2025-04-09 | End: 2026-04-09

## 2025-04-09 NOTE — PROGRESS NOTES
Patient ID: [id number]     Chief Complaint: Follow-up and Anxiety (obesity)        HPI:     Kelly Levin is a 55 y.o. female here today for a follow up obesity.   - She is obese, gained 16 pounds since last visit, would like Rx Wegovy to help with weight loss. She denies family history of MEN II or medullary thyroid cancer or personal history of pancreatitis. She is menopausal and she is not trying to get pregnant. She is not interested in seeing a dietician.    - She is here for followup anxiety. Anxiety is controlled with Xanax p.r.n., she takes Rx Xanax sparingly, no side effects, she needs Rx refill today. No recent acute stressors.  Symptoms include  feeling nervous, anxious, on edge, not being able to stop control worrying, trouble relaxing, mind racing at night, becoming easily annoyed or irritable -which then leads to sadness, tearfulness-being overwhelmed.  Stressors and coping mechanisms discussed at length. She has tried Lexapro with drowsiness and she stopped taking the Lexapro several months ago. She has tried taking Buspirone 7.5 mg x bid without improvement of symptoms. She denies depression, SI/HI, or AH/VH. She is not interested in outpatient counseling.   - GERD is controlled with Rx Protonix, no side effects, she needs Rx Protonix refilled today.   - Patient is without any other complaints today.         -------------------------------------    Essential hypertension    Gastric reflux    Menstrual migraine without status migrainosus, not intractable    Mixed hyperlipidemia    Seasonal allergies        Past Surgical History:   Procedure Laterality Date    COLONOSCOPY  04/20/2021    REDUCTION OF BOTH BREASTS Bilateral 02/22/2022       Review of patient's allergies indicates:   Allergen Reactions    Amlodipine besylate Swelling    Ace inhibitors      Other reaction(s): Cough    Amlodipine Edema       Outpatient Medications Marked as Taking for the 4/9/25 encounter (Office Visit) with David  Gladys PEREYRA MD   Medication Sig Dispense Refill    doxycycline 50 MG capsule Take 50 mg by mouth.      fluticasone propionate (FLONASE) 50 mcg/actuation nasal spray 1 spray by Each Nostril route once daily.      loratadine (CLARITIN) 10 mg tablet Take 1 tablet (10 mg total) by mouth once daily. 90 tablet 3    losartan (COZAAR) 100 MG tablet Take 1 tablet (100 mg total) by mouth once daily. 90 tablet 3    pravastatin (PRAVACHOL) 40 MG tablet Take 1 tablet (40 mg total) by mouth once daily. 90 tablet 3    rizatriptan (MAXALT-MLT) 10 MG disintegrating tablet Take 10 mg by mouth.      [DISCONTINUED] esomeprazole (NEXIUM) 40 MG capsule Take 1 capsule (40 mg total) by mouth before breakfast. 30 capsule 0       Social History[1]     Family History   Problem Relation Name Age of Onset    Hypertension Mother      Stroke Father          Subjective:       Review of Systems:    See HPI for details    Constitutional: Denies Change in appetite. Denies Chills. Denies Fever. Denies Night sweats.  Eye: Denies Blurred vision. Denies Discharge. Denies Eye pain.  ENT: Denies Decreased hearing. Denies Sore throat. Denies Swollen glands.  Respiratory: Denies Cough. Denies Shortness of breath. Denies Shortness of breath with exertion. Denies Wheezing.  Cardiovascular: Denies Chest pain at rest. Denies Chest pain with exertion. Denies Irregular heartbeat. Denies Palpitations.  Gastrointestinal: Denies Abdominal pain. Denies Diarrhea. Denies Nausea. Denies Vomiting. Denies Hematemesis or Hematochezia.  Genitourinary: Denies Dysuria. Denies Urinary frequency. Denies Urinary urgency. Denies Blood in urine.  Endocrine: Denies Cold intolerance. Denies Excessive thirst. Denies Heat intolerance. Denies Weight loss. Denies Weight gain.  Musculoskeletal: Denies Painful joints. Denies Weakness.  Integumentary: Denies Rash. Denies Itching. Denies Dry skin.  Neurologic: Denies Dizziness. Denies Fainting. Denies Headache.  Psychiatric: Denies  "Depression. Reports Anxiety. Denies Suicidal/Homicidal ideations.    All Other ROS: Negative except as stated in HPI.       Objective:     /85 (BP Location: Right arm, Patient Position: Sitting)   Pulse 78   Temp 97.9 °F (36.6 °C) (Oral)   Resp 18   Ht 5' 4" (1.626 m)   Wt 92.1 kg (203 lb)   SpO2 98%   BMI 34.84 kg/m²     Physical Exam    General: Alert and oriented, No acute distress. Obese.   Head: Normocephalic, Atraumatic.  Eye: Pupils are equal, round and reactive to light, Extraocular movements are intact, Sclera non-icteric.  Ears/Nose/Throat: Normal, Mucosa moist,Clear.  Neck/Thyroid: Supple, Non-tender, No carotid bruit, No palpable thyromegaly or thyroid nodule, No lymphadenopathy, No JVD, Full range of motion.  Respiratory: Clear to auscultation bilaterally; No wheezes, rales or rhonchi,Non-labored respirations, Symmetrical chest wall expansion.  Cardiovascular: Regular rate and rhythm, S1/S2 normal, No murmurs, rubs or gallops.  Gastrointestinal: Soft, Non-tender, Non-distended, Normal bowel sounds, No palpable organomegaly.  Musculoskeletal: Normal range of motion.  Integumentary: Warm, Dry, Intact, No suspicious lesions or rashes.  Extremities: No clubbing, cyanosis or edema  Neurologic: No focal deficits, Cranial Nerves II-XII are grossly intact, Motor strength normal upper and lower extremities, Sensory exam intact.  Psychiatric: Normal interaction, Coherent speech, Euthymic mood, Appropriate affect         Assessment:       ICD-10-CM ICD-9-CM   1. Generalized anxiety disorder  F41.1 300.02   2. Class 1 obesity due to excess calories with serious comorbidity and body mass index (BMI) of 34.0 to 34.9 in adult  E66.811 278.00    E66.09 V85.34    Z68.34    3. Gastric reflux  K21.9 530.81        Plan:     Problem List Items Addressed This Visit          Psychiatric    Generalized anxiety disorder - Primary    Relevant Medications    ALPRAZolam (XANAX) 0.25 MG tablet       Endocrine    Class " 1 obesity due to excess calories with serious comorbidity and body mass index (BMI) of 34.0 to 34.9 in adult    Relevant Medications    semaglutide, weight loss, (WEGOVY) 0.25 mg/0.5 mL PnIj       GI    Gastric reflux    Relevant Medications    pantoprazole (PROTONIX) 40 MG tablet    1. Generalized anxiety disorder  - ALPRAZolam (XANAX) 0.25 MG tablet; Take 1 tablet (0.25 mg total) by mouth nightly as needed for Anxiety or Insomnia.  Dispense: 30 tablet; Refill: 2  - Continue Rx low dose of Xanax 0.25 mg nightly prn and to use only sparingly to help with anxiety, Rx Xanax refilled today.  reviewed today. Continue relaxation techniques. Will titrate medication as needed/tolerated. Notify M.D. or ER if symptoms persist or worsen, SI/HI, temp greater than 100.4, or any acute illness.    Start   /  Continue   Practice deep breathing or abdominal breathing exercises when anxiety occurs.  Exercise daily. Get sunlight daily.  Avoid caffeine, alcohol and stimulants.  Practice positive phrases and repeat throughout the day, yoga, lavender scents or Chamomile tea will help anxiety.  Set healthy boundaries, avoid people and conversations that increase stress.  Reports any symptoms of suicidal or homicidal ideations immediately, if clinic is closed go to nearest emergency room.     2. Class 1 obesity due to excess calories with serious comorbidity and body mass index (BMI) of 34.0 to 34.9 in adult  - semaglutide, weight loss, (WEGOVY) 0.25 mg/0.5 mL PnIj; Inject 0.25 mg into the skin every 7 days.  Dispense: 2 mL; Refill: 1  - Diet, exercise, and 10% weight loss encouraged. Rx trial of Wegovy with close monitoring to help treat obesity. She agrees to avoid pregnancy with Wegovy. Will titrate Rx Wegovy as needed/tolerated until max dose achieved. Notify M.D. or ER if symptoms persist or worsen, adverse Rx side effects, pancreatitis, biliary colic, temp greater than 100.4, or any acute illness.     Body mass index is 34.84  kg/m².  Goal BMI <30.  Exercise 5 times a week for 30 minutes per day.  Avoid soda, simple sugars, excessive rice, potatoes or bread. Limit fast foods and fried foods.  Choose complex carbs in moderation (example: green vegetables, beans, oatmeal). Eat plenty of fresh fruits and vegetables with lean meats daily.  Do not skip meals. Eat a balanced portion size.  Avoid fad diets. Consider permanent healthy life style changes.      3. Gastric reflux  - Rx pantoprazole (PROTONIX) 40 MG tablet; Take 1 tablet (40 mg total) by mouth once daily.  Dispense: 90 tablet; Refill: 3 refilled today; GERD dietary precautions encouraged. Notify M.D. or ER if symptoms persist or worsen, abdominal pain, vomiting, bloody stools, temp >100.4, or any acute illness.          Kelly was seen today for follow-up and anxiety.    Diagnoses and all orders for this visit:    Generalized anxiety disorder  -     ALPRAZolam (XANAX) 0.25 MG tablet; Take 1 tablet (0.25 mg total) by mouth nightly as needed for Anxiety or Insomnia.    Class 1 obesity due to excess calories with serious comorbidity and body mass index (BMI) of 34.0 to 34.9 in adult  -     semaglutide, weight loss, (WEGOVY) 0.25 mg/0.5 mL PnIj; Inject 0.25 mg into the skin every 7 days.    Gastric reflux  -     pantoprazole (PROTONIX) 40 MG tablet; Take 1 tablet (40 mg total) by mouth once daily.          Medication List with Changes/Refills   New Medications    PANTOPRAZOLE (PROTONIX) 40 MG TABLET    Take 1 tablet (40 mg total) by mouth once daily.       Start Date: 4/9/2025  End Date: 4/9/2026    SEMAGLUTIDE, WEIGHT LOSS, (WEGOVY) 0.25 MG/0.5 ML PNIJ    Inject 0.25 mg into the skin every 7 days.       Start Date: 4/9/2025  End Date: 6/8/2025   Current Medications    DOXYCYCLINE 50 MG CAPSULE    Take 50 mg by mouth.       Start Date: 10/17/2024End Date: --    FLUTICASONE PROPIONATE (FLONASE) 50 MCG/ACTUATION NASAL SPRAY    1 spray by Each Nostril route once daily.       Start Date:  --        End Date: --    LORATADINE (CLARITIN) 10 MG TABLET    Take 1 tablet (10 mg total) by mouth once daily.       Start Date: 6/17/2024 End Date: --    LOSARTAN (COZAAR) 100 MG TABLET    Take 1 tablet (100 mg total) by mouth once daily.       Start Date: 6/3/2024  End Date: 6/3/2025    PRAVASTATIN (PRAVACHOL) 40 MG TABLET    Take 1 tablet (40 mg total) by mouth once daily.       Start Date: 6/3/2024  End Date: 6/3/2025    RIZATRIPTAN (MAXALT-MLT) 10 MG DISINTEGRATING TABLET    Take 10 mg by mouth.       Start Date: 3/10/2023 End Date: --   Changed and/or Refilled Medications    Modified Medication Previous Medication    ALPRAZOLAM (XANAX) 0.25 MG TABLET ALPRAZolam (XANAX) 0.25 MG tablet       Take 1 tablet (0.25 mg total) by mouth nightly as needed for Anxiety or Insomnia.    Take 1 tablet (0.25 mg total) by mouth nightly as needed for Anxiety or Insomnia.       Start Date: 4/9/2025  End Date: 7/8/2025    Start Date: 12/3/2024 End Date: 4/9/2025   Discontinued Medications    ESOMEPRAZOLE (NEXIUM) 40 MG CAPSULE    Take 1 capsule (40 mg total) by mouth before breakfast.       Start Date: 1/24/2025 End Date: 4/9/2025          Follow up in about 14 weeks (around 7/16/2025).        [1]   Social History  Socioeconomic History    Marital status: Single    Number of children: 3   Occupational History    Occupation: [employer]: [employer]   Tobacco Use    Smoking status: Never    Smokeless tobacco: Never   Substance and Sexual Activity    Alcohol use: Not Currently    Drug use: Never    Sexual activity: Not Currently     Social Drivers of Health     Financial Resource Strain: Low Risk  (7/8/2024)    Overall Financial Resource Strain (CARDIA)     Difficulty of Paying Living Expenses: Not hard at all   Food Insecurity: No Food Insecurity (7/8/2024)    Hunger Vital Sign     Worried About Running Out of Food in the Last Year: Never true     Ran Out of Food in the Last Year: Never true   Transportation Needs: No Transportation  Needs (7/8/2024)    TRANSPORTATION NEEDS     Transportation : No   Physical Activity: Insufficiently Active (7/8/2024)    Exercise Vital Sign     Days of Exercise per Week: 2 days     Minutes of Exercise per Session: 30 min   Stress: Stress Concern Present (7/8/2024)    [employer] [place] [employer] - Occupational Stress Questionnaire     Feeling of Stress : To some extent   Housing Stability: Unknown (7/8/2024)    Housing Stability Vital Sign     Unable to Pay for Housing in the Last Year: No     Homeless in the Last Year: No

## 2025-04-25 ENCOUNTER — TELEPHONE (OUTPATIENT)
Dept: FAMILY MEDICINE | Facility: CLINIC | Age: 55
End: 2025-04-25
Payer: COMMERCIAL

## 2025-04-25 NOTE — TELEPHONE ENCOUNTER
I voiced to pt that we are out of samples of WeGovy and I will notify pt when we have more samples in the office. Pt voiced to me that she cannot afford the cost for Wegovy, pt voiced it is about $300 at [facility]. Pt voiced if she cannot get anymore samples, she will like to get back on the [employer].

## 2025-04-25 NOTE — TELEPHONE ENCOUNTER
----- Message from Precious sent at 4/24/2025  2:47 PM CDT -----  .Who Called: Kelly Soliz is requesting assistance/information from provider's office.Symptoms (please be specific): N/A How long has patient had these symptoms:  N/AList of preferred pharmacies on file (remove unneeded): [unfilled]If different, enter pharmacy into here including location and phone number: N/APreferred Method of Contact: Phone CallPatient's Preferred Phone Number on File: [phone] Best Call Back Number, if different:Additional Information: Pt called stating she was advised by Dr. Hernandez to contact office once she's running low on her semaglutide, weight loss, (WEGOVY) 0.25 mg/0.5 mL PnIj snd she'd be provided with samples from the office. Pt says she's now requesting samples. Please advise, thank you.

## 2025-05-12 ENCOUNTER — TELEPHONE (OUTPATIENT)
Dept: FAMILY MEDICINE | Facility: CLINIC | Age: 55
End: 2025-05-12
Payer: COMMERCIAL

## 2025-05-12 ENCOUNTER — DOCUMENTATION ONLY (OUTPATIENT)
Dept: FAMILY MEDICINE | Facility: CLINIC | Age: 55
End: 2025-05-12
Payer: COMMERCIAL

## 2025-06-10 ENCOUNTER — TELEPHONE (OUTPATIENT)
Dept: FAMILY MEDICINE | Facility: CLINIC | Age: 55
End: 2025-06-10
Payer: COMMERCIAL

## 2025-06-10 NOTE — TELEPHONE ENCOUNTER
Patient called looking for wegovy samples, I informed her that we might get some towards the end of the week if she wants to check back. Patient voices thanks !

## 2025-06-19 DIAGNOSIS — E78.2 MIXED HYPERLIPIDEMIA: ICD-10-CM

## 2025-06-19 RX ORDER — PRAVASTATIN SODIUM 40 MG/1
40 TABLET ORAL
Qty: 90 TABLET | Refills: 3 | Status: SHIPPED | OUTPATIENT
Start: 2025-06-19

## 2025-07-10 ENCOUNTER — TELEPHONE (OUTPATIENT)
Dept: FAMILY MEDICINE | Facility: CLINIC | Age: 55
End: 2025-07-10
Payer: COMMERCIAL

## 2025-07-10 DIAGNOSIS — Z00.00 WELLNESS EXAMINATION: Primary | ICD-10-CM

## 2025-07-10 NOTE — TELEPHONE ENCOUNTER
Left VM for patient to confirm appt on 07/16 and reminder to complete labs before coming to appt.

## 2025-07-11 ENCOUNTER — TELEPHONE (OUTPATIENT)
Dept: FAMILY MEDICINE | Facility: CLINIC | Age: 55
End: 2025-07-11
Payer: COMMERCIAL

## 2025-07-11 NOTE — TELEPHONE ENCOUNTER
Pt voiced that she will not be able to make her appt for 2:00 on 7/16/25 due to pt getting off of work at that time and that she will be late. I voiced to pt about the 10 minute marybeth period and moved her appt down to 2:30 that same day. Pt voiced understanding and thanks!

## 2025-07-11 NOTE — TELEPHONE ENCOUNTER
Copied from CRM #[id number]. Topic: Appointments - Appointment Rescheduling  >> Jul 11, 2025  8:05 AM Lorraine wrote:  Who Called: Kelly Levin    Caller is requesting assistance/information from provider's office.    Symptoms (please be specific):    How long has patient had these symptoms:    List of preferred pharmacies on file (remove unneeded): [unfilled]  If different, enter pharmacy into here including location and phone number:       Preferred Method of Contact: Phone Call  Patient's Preferred Phone Number on File: [phone]   Best Call Back Number, if different:  Additional Information: Pt called to speak with the nurse about her upcoming appt.

## 2025-07-12 DIAGNOSIS — I10 ESSENTIAL HYPERTENSION: ICD-10-CM

## 2025-07-14 RX ORDER — LOSARTAN POTASSIUM 100 MG/1
100 TABLET ORAL
Qty: 90 TABLET | Refills: 0 | Status: SHIPPED | OUTPATIENT
Start: 2025-07-14

## 2025-07-16 ENCOUNTER — OFFICE VISIT (OUTPATIENT)
Dept: FAMILY MEDICINE | Facility: CLINIC | Age: 55
End: 2025-07-16
Payer: COMMERCIAL

## 2025-07-16 VITALS
SYSTOLIC BLOOD PRESSURE: 128 MMHG | HEART RATE: 87 BPM | HEIGHT: 64 IN | DIASTOLIC BLOOD PRESSURE: 86 MMHG | OXYGEN SATURATION: 98 % | BODY MASS INDEX: 33.83 KG/M2 | WEIGHT: 198.13 LBS

## 2025-07-16 DIAGNOSIS — F41.1 GENERALIZED ANXIETY DISORDER: ICD-10-CM

## 2025-07-16 DIAGNOSIS — Z13.6 ENCOUNTER FOR SCREENING FOR CARDIOVASCULAR DISORDERS: ICD-10-CM

## 2025-07-16 DIAGNOSIS — E66.09 CLASS 1 OBESITY DUE TO EXCESS CALORIES WITH SERIOUS COMORBIDITY AND BODY MASS INDEX (BMI) OF 34.0 TO 34.9 IN ADULT: ICD-10-CM

## 2025-07-16 DIAGNOSIS — I10 ESSENTIAL HYPERTENSION: ICD-10-CM

## 2025-07-16 DIAGNOSIS — E78.2 MIXED HYPERLIPIDEMIA: ICD-10-CM

## 2025-07-16 DIAGNOSIS — Z00.00 WELLNESS EXAMINATION: Primary | ICD-10-CM

## 2025-07-16 DIAGNOSIS — J30.2 SEASONAL ALLERGIES: ICD-10-CM

## 2025-07-16 DIAGNOSIS — E66.811 CLASS 1 OBESITY DUE TO EXCESS CALORIES WITH SERIOUS COMORBIDITY AND BODY MASS INDEX (BMI) OF 34.0 TO 34.9 IN ADULT: ICD-10-CM

## 2025-07-16 RX ORDER — PHENTERMINE HYDROCHLORIDE 37.5 MG/1
18.75 TABLET ORAL
Qty: 30 TABLET | Refills: 0 | Status: SHIPPED | OUTPATIENT
Start: 2025-07-16 | End: 2025-08-15

## 2025-07-16 RX ORDER — LORATADINE 10 MG/1
10 TABLET ORAL DAILY
Qty: 90 TABLET | Refills: 3 | Status: SHIPPED | OUTPATIENT
Start: 2025-07-16

## 2025-07-16 RX ORDER — ALPRAZOLAM 0.25 MG/1
0.25 TABLET ORAL NIGHTLY PRN
Qty: 30 TABLET | Refills: 2 | Status: SHIPPED | OUTPATIENT
Start: 2025-07-16 | End: 2025-10-14

## 2025-07-16 NOTE — PROGRESS NOTES
Patient ID: [id number]     Chief Complaint: Annual Exam        HPI:     Kelly Levin is a 55 y.o. female here today for annual wellness exam.  . Exercise consist of stationary bike. Diet is described as balanced, has lost 5 pounds since last visit with [facility], but did better with Adipex, no side effects, hasn't been on Adipex in over 3 months. Declined dietician referral. She denies alcohol use, denies caffeine use, denies tobacco/vape use and denies drug use. Seatbelt use always.      Cervical Cancer Screening - Last Pap 08/28/2023 with Dr Tamime Lundberg.    Breast Cancer Screening - Last Mammogram 11/04/2024. Normal. Follow up in 1 year.   Colon Cancer Screening - Colonoscopy 4/20/2021, polyp removal, Dr. Bell. Follow up in 7 years.  Eye Exam - Last eye exam 7/2025. She wear glassess and contacts.    Dental Exam -goes every 6 months  Vaccinations - UTD       Immunization History   Administered Date(s) Administered    COVID-19, MRNA, LN-S, PF (Pfizer) (Gray Cap) 05/27/2022    COVID-19, MRNA, LN-S, PF (Pfizer) (Purple Cap) 03/07/2021, 03/28/2021, 01/06/2022    DT (Pediatric) 02/27/1975, 05/10/1985    DTP 1969, 1969, 1969, 08/25/1970    Influenza 10/27/2010, 10/14/2011, 10/02/2017    Influenza - Quadrivalent 09/21/2016    Influenza - Quadrivalent - MDCK - PF 10/17/2019, 01/06/2022    Influenza - Quadrivalent - PF (6-35 months) 10/02/2017    Influenza - Quadrivalent - PF *Preferred* (6 months and older) 09/28/2020    Measles 03/05/1970    Measles / Rubella 06/27/1974, 03/19/1976    Poliovirus 1969, 1969, 08/25/1970, 06/27/1974, 03/19/1976    Td - PF (ADULT) 11/03/2008    Tdap 12/12/2017    Varicella 03/19/1976    Zoster Recombinant 01/06/2022, 04/11/2022      -Generalized anxiety is controlled with xanax 0.25 mg 1/2 tab daily, no side effects, needs Rx refill today. She denies medication side effects. Discussed counseling, she declined. She denies feeling depressed, denies  SI/HI.   -Hypertension is at goal. She is compliant with BP medications and denies medication side effects. Father had MI at the age of 56. Patient would like calcium score done.   -HLD is controlled with current Rx. Denies myalgias or abdominal pain.   -Migraine headaches controlled with current Rx. She takes medication only as needed.  -Reflux is controlled with Rx. She is taking the medication daily with no side effects.  - Seasonal allergies are controlled with Rx, no side effects, she needs Rx refill of Claritin today.   - Hyperparathyroidism s/p parathyroidectomy is asymptomatic, followed by ENT (Dr. Will) and Endocrinology (Dr. Arellano).  - Patient is due for annual wellness labs, orders are in file.   - Patient is without a ny other complaints today.       -------------------------------------    Essential hypertension    Gastric reflux    Menstrual migraine without status migrainosus, not intractable    Mixed hyperlipidemia    Seasonal allergies        Past Surgical History:   Procedure Laterality Date    COLONOSCOPY  04/20/2021    REDUCTION OF BOTH BREASTS Bilateral 02/22/2022       Review of patient's allergies indicates:   Allergen Reactions    Amlodipine besylate Swelling    Ace inhibitors      Other reaction(s): Cough    Amlodipine Edema       Outpatient Medications Marked as Taking for the 7/16/25 encounter (Office Visit) with Gladys Hernandez MD   Medication Sig Dispense Refill    doxycycline 50 MG capsule Take 50 mg by mouth.      fluticasone propionate (FLONASE) 50 mcg/actuation nasal spray 1 spray by Each Nostril route once daily.      losartan (COZAAR) 100 MG tablet Take 1 tablet by mouth once daily 90 tablet 0    pantoprazole (PROTONIX) 40 MG tablet Take 1 tablet (40 mg total) by mouth once daily. 90 tablet 3    pravastatin (PRAVACHOL) 40 MG tablet Take 1 tablet by mouth once daily 90 tablet 3    rizatriptan (MAXALT-MLT) 10 MG disintegrating tablet Take 10 mg by mouth.      [DISCONTINUED]  "loratadine (CLARITIN) 10 mg tablet Take 1 tablet (10 mg total) by mouth once daily. 90 tablet 3       Social History[1]     Family History   Problem Relation Name Age of Onset    Hypertension Mother      Stroke Father          Subjective:       Review of Systems:    See HPI for details    Constitutional: No fever, No chills, No fatigue.   Eye: No blurring, No visual disturbances.   Ear/Nose/Mouth/Throat: No decreased hearing, No ear pain, No nasal congestion, No sore throat.   Respiratory: No shortness of breath, No cough, No wheezing.   Cardiovascular: No chest pain, No palpitations, No peripheral edema.   Breast: Both breasts, No lump/ mass, No pain.   Nipple discharge: None.   Gastrointestinal: No nausea, No vomiting, No diarrhea, No constipation, No abdominal pain.   Genitourinary: No dysuria, No hematuria.   Gynecologic: Negative except as documented in history of present illness.   Hematology/Lymphatics: No bruising tendency, No bleeding tendency, No swollen lymph glands.   Endocrine: No excessive thirst, No polyuria, No excessive hunger.   Musculoskeletal: No joint pain, No muscle pain, No decreased range of motion.   Integumentary: No rash, No pruritus.   Neurologic: No abnormal balance, No confusion, No headache.   Psychiatric: No sleeping problems, Reports anxiety, No depression, Not suicidal, No hallucinations      All Other ROS: Negative except as stated in HPI.       Objective:     /86 (BP Location: Left arm, Patient Position: Sitting)   Pulse 87   Ht 5' 4" (1.626 m)   Wt 89.9 kg (198 lb 1.6 oz)   SpO2 98%   BMI 34.00 kg/m²     Physical Exam    General: Alert and oriented, No acute distress.   Appearance: Obese.   Eye: Pupils are equal, round and reactive to light, Normal conjunctiva.   HENT: Normocephalic, Tympanic membranes are clear, Normal hearing, Oral mucosa is moist, No pharyngeal erythema.   Throat: Pharynx ( Not edematous, No exudate ).   Neck: Supple, Non-tender, No carotid bruit, " No lymphadenopathy, No thyromegaly.   Respiratory: Lungs are clear to auscultation, Respirations are non-labored, Breath sounds are equal, Symmetrical chest wall expansion, No chest wall tenderness.   Cardiovascular: Normal rate, Regular rhythm, No murmur, Good pulses equal in all extremities, No edema.   Gastrointestinal: Soft, Non-tender, Non-distended, Normal bowel sounds, No organomegaly, Abdomen.   Genitourinary: No costovertebral angle tenderness.   Musculoskeletal: Normal range of motion, No tenderness, Normal gait.   Neurologic: No focal deficits, Cranial Nerves II-XII are grossly intact.   Psychiatric: Cooperative, Appropriate mood & affect, Normal judgment, Non-suicidal.   Mood and affect: Calm.   Behavior: Relaxed.         Assessment:       ICD-10-CM ICD-9-CM   1. Wellness examination  Z00.00 V70.0   2. Essential hypertension  I10 401.9   3. Mixed hyperlipidemia  E78.2 272.2   4. Generalized anxiety disorder  F41.1 300.02   5. Class 1 obesity due to excess calories with serious comorbidity and body mass index (BMI) of 34.0 to 34.9 in adult  E66.811 278.00    E66.09 V85.34    Z68.34    6. Seasonal allergies  J30.2 477.9   7. Encounter for screening for cardiovascular disorders  Z13.6 V81.2        Plan:     Problem List Items Addressed This Visit          Psychiatric    Generalized anxiety disorder    Relevant Medications    ALPRAZolam (XANAX) 0.25 MG tablet       ENT    Seasonal allergies    Relevant Medications    loratadine (CLARITIN) 10 mg tablet       Cardiac/Vascular    Essential hypertension    Mixed hyperlipidemia       Endocrine    Class 1 obesity due to excess calories with serious comorbidity and body mass index (BMI) of 34.0 to 34.9 in adult    Relevant Medications    phentermine (ADIPEX-P) 37.5 mg tablet     Other Visit Diagnoses         Wellness examination    -  Primary      Encounter for screening for cardiovascular disorders        Relevant Orders    CT Calcium Scoring Cardiac         1.  Wellness examination  - Will treat pending lab results. Monthly breast self exam encouraged. Diet, exercise, and 10% weight loss encouraged. Keep appointment for dental exams x q6 months as scheduled. Keep appointment for annual eye exam as scheduled. Keep appointment with GYN for annual pap smear, MMG as scheduled. Continue followup with specialists as scheduled. Notify M.D. or ER if temp greater than 100.4, or any acute illness.      2. Essential hypertension  - BP is well controlled. Continue Losartan as prescribed. Keep daily BP log. Notify M.D. or ER if BP >170/100 or <90/60, chest pain, palpitations, headache, SOB, temp greater than 100.4, or any acute illness.   Continue  Low Sodium Diet (DASH Diet - Less than 2 grams of sodium per day).  Monitor blood pressure daily and log. Report consistent numbers greater than 140/90.  Smoking cessation encouraged to aid in BP reduction.  Maintain healthy weight with goal BMI <30. Exercise 30 minutes per day, 5 days per week.      3. Mixed hyperlipidemia  - Continue Pravastatin, will treat pending results.   Continue  Stressed importance of dietary modifications. Follow a low cholesterol, low saturated fat diet with less that 200mg of cholesterol a day.  Avoid fried foods and high saturated fats (high saturated fats less than 7% of calories).  Add Flax Seed/Fish Oil supplements to diet. Increase dietary fiber.  Regular exercise can reduce LDL and raise HDL. Stressed importance of physical activity 5 times per week for 30 minutes per day.      4. Generalized anxiety disorder  - ALPRAZolam (XANAX) 0.25 MG tablet; Take 1 tablet (0.25 mg total) by mouth nightly as needed for Anxiety or Insomnia.  Dispense: 30 tablet; Refill: 2  - Controlled. Continue Rx low dose of Xanax 0.25 mg nightly prn and to use only sparingly to help with anxiety, Rx Xanax refilled today.  reviewed today. Continue relaxation techniques. Will titrate medication as needed/tolerated. Notify M.D. or ER  if symptoms persist or worsen, SI/HI, temp greater than 100.4, or any acute illness.    Start   /  Continue   Practice deep breathing or abdominal breathing exercises when anxiety occurs.  Exercise daily. Get sunlight daily.  Avoid caffeine, alcohol and stimulants.  Practice positive phrases and repeat throughout the day, yoga, lavender scents or Chamomile tea will help anxiety.  Set healthy boundaries, avoid people and conversations that increase stress.  Reports any symptoms of suicidal or homicidal ideations immediately, if clinic is closed go to nearest emergency room.     5. Class 1 obesity due to excess calories with serious comorbidity and body mass index (BMI) of 34.0 to 34.9 in adult  - phentermine (ADIPEX-P) 37.5 mg tablet; Take 0.5 tablets (18.75 mg total) by mouth 2 (two) times daily before meals.  Dispense: 30 tablet; Refill: 0  - Diet, exercise, and 10% weight loss encouraged.  reviewed today. Will prescribe Adipex for max of 3 months. Will discontinue Adipex and patient to notify M.D. or ER if BP >140/90, chest pain, palpitations, SI/HI with Adipex. Notify M.D. or ER if temp greater than 100.4 or any acute illness.   Body mass index is 34 kg/m².  Goal BMI <30.  Exercise 5 times a week for 30 minutes per day.  Avoid soda, simple sugars, excessive rice, potatoes or bread. Limit fast foods and fried foods.  Choose complex carbs in moderation (example: green vegetables, beans, oatmeal). Eat plenty of fresh fruits and vegetables with lean meats daily.  Do not skip meals. Eat a balanced portion size.  Avoid fad diets. Consider permanent healthy life style changes.      6. Seasonal allergies  - Controlled. Rx loratadine (CLARITIN) 10 mg tablet; Take 1 tablet (10 mg total) by mouth once daily.  Dispense: 90 tablet; Refill: 3 refilled today; increase fluid intake.     7. Encounter for screening for cardiovascular disorders  - CT Calcium Scoring Cardiac; Future        Alvaroca was seen today for annual  exam.    Diagnoses and all orders for this visit:    Wellness examination    Essential hypertension    Mixed hyperlipidemia    Generalized anxiety disorder  -     ALPRAZolam (XANAX) 0.25 MG tablet; Take 1 tablet (0.25 mg total) by mouth nightly as needed for Anxiety or Insomnia.    Class 1 obesity due to excess calories with serious comorbidity and body mass index (BMI) of 34.0 to 34.9 in adult  -     phentermine (ADIPEX-P) 37.5 mg tablet; Take 0.5 tablets (18.75 mg total) by mouth 2 (two) times daily before meals.    Seasonal allergies  -     loratadine (CLARITIN) 10 mg tablet; Take 1 tablet (10 mg total) by mouth once daily.    Encounter for screening for cardiovascular disorders  -     CT Calcium Scoring Cardiac; Future          Medication List with Changes/Refills   New Medications    PHENTERMINE (ADIPEX-P) 37.5 MG TABLET    Take 0.5 tablets (18.75 mg total) by mouth 2 (two) times daily before meals.       Start Date: 7/16/2025 End Date: 8/15/2025   Current Medications    DOXYCYCLINE 50 MG CAPSULE    Take 50 mg by mouth.       Start Date: 10/17/2024End Date: --    FLUTICASONE PROPIONATE (FLONASE) 50 MCG/ACTUATION NASAL SPRAY    1 spray by Each Nostril route once daily.       Start Date: --        End Date: --    LOSARTAN (COZAAR) 100 MG TABLET    Take 1 tablet by mouth once daily       Start Date: 7/14/2025 End Date: --    PANTOPRAZOLE (PROTONIX) 40 MG TABLET    Take 1 tablet (40 mg total) by mouth once daily.       Start Date: 4/9/2025  End Date: 4/9/2026    PRAVASTATIN (PRAVACHOL) 40 MG TABLET    Take 1 tablet by mouth once daily       Start Date: 6/19/2025 End Date: --    RIZATRIPTAN (MAXALT-MLT) 10 MG DISINTEGRATING TABLET    Take 10 mg by mouth.       Start Date: 3/10/2023 End Date: --   Changed and/or Refilled Medications    Modified Medication Previous Medication    ALPRAZOLAM (XANAX) 0.25 MG TABLET ALPRAZolam (XANAX) 0.25 MG tablet       Take 1 tablet (0.25 mg total) by mouth nightly as needed for  Anxiety or Insomnia.    Take 1 tablet (0.25 mg total) by mouth nightly as needed for Anxiety or Insomnia.       Start Date: 7/16/2025 End Date: 10/14/2025    Start Date: 4/9/2025  End Date: 7/16/2025    LORATADINE (CLARITIN) 10 MG TABLET loratadine (CLARITIN) 10 mg tablet       Take 1 tablet (10 mg total) by mouth once daily.    Take 1 tablet (10 mg total) by mouth once daily.       Start Date: 7/16/2025 End Date: --    Start Date: 6/17/2024 End Date: 7/16/2025          Follow up in about 4 weeks (around 8/13/2025) for Obesity Followup, In office Followup with MD.          [1]   Social History  Socioeconomic History    Marital status: Single    Number of children: 3   Occupational History    Occupation: [employer]:    Tobacco Use    Smoking status: Never    Smokeless tobacco: Never   Substance and Sexual Activity    Alcohol use: Not Currently    Drug use: Never    Sexual activity: Not Currently     Social Drivers of Health     Financial Resource Strain: Low Risk  (7/8/2024)    Overall Financial Resource Strain (CARDIA)     Difficulty of Paying Living Expenses: Not hard at all   Food Insecurity: No Food Insecurity (7/8/2024)    Hunger Vital Sign     Worried About Running Out of Food in the Last Year: Never true     Ran Out of Food in the Last Year: Never true   Transportation Needs: No Transportation Needs (7/8/2024)    TRANSPORTATION NEEDS     Transportation : No   Physical Activity: Insufficiently Active (7/8/2024)    Exercise Vital Sign     Days of Exercise per Week: 2 days     Minutes of Exercise per Session: 30 min   Stress: Stress Concern Present (7/8/2024)    [employer] - Occupational Stress Questionnaire     Feeling of Stress : To some extent   Housing Stability: Unknown (7/8/2024)    Housing Stability Vital Sign     Unable to Pay for Housing in the Last Year: No     Homeless in the Last Year: No

## 2025-07-17 ENCOUNTER — PATIENT MESSAGE (OUTPATIENT)
Dept: FAMILY MEDICINE | Facility: CLINIC | Age: 55
End: 2025-07-17
Payer: COMMERCIAL

## 2025-07-23 ENCOUNTER — HOSPITAL ENCOUNTER (OUTPATIENT)
Dept: RADIOLOGY | Facility: HOSPITAL | Age: 55
Discharge: HOME | End: 2025-07-23
Attending: FAMILY MEDICINE
Payer: COMMERCIAL

## 2025-07-23 DIAGNOSIS — Z13.6 ENCOUNTER FOR SCREENING FOR CARDIOVASCULAR DISORDERS: ICD-10-CM

## 2025-07-23 PROCEDURE — 75571 CT HRT W/O DYE W/CA TEST: CPT | Mod: TC

## 2025-07-24 PROBLEM — R93.1 AGATSTON CORONARY ARTERY CALCIUM SCORE BETWEEN 100 AND 400: Status: ACTIVE | Noted: 2025-07-24

## 2025-08-14 ENCOUNTER — TELEPHONE (OUTPATIENT)
Dept: FAMILY MEDICINE | Facility: CLINIC | Age: 55
End: 2025-08-14
Payer: COMMERCIAL

## 2025-08-20 ENCOUNTER — OFFICE VISIT (OUTPATIENT)
Dept: FAMILY MEDICINE | Facility: CLINIC | Age: 55
End: 2025-08-20
Payer: COMMERCIAL

## 2025-08-20 VITALS
OXYGEN SATURATION: 99 % | TEMPERATURE: 98 F | DIASTOLIC BLOOD PRESSURE: 86 MMHG | RESPIRATION RATE: 16 BRPM | WEIGHT: 200 LBS | SYSTOLIC BLOOD PRESSURE: 124 MMHG | BODY MASS INDEX: 34.15 KG/M2 | HEIGHT: 64 IN | HEART RATE: 98 BPM

## 2025-08-20 DIAGNOSIS — Z12.31 VISIT FOR SCREENING MAMMOGRAM: ICD-10-CM

## 2025-08-20 DIAGNOSIS — L70.0 ACNE VULGARIS: ICD-10-CM

## 2025-08-20 DIAGNOSIS — E66.811 CLASS 1 OBESITY DUE TO EXCESS CALORIES WITH SERIOUS COMORBIDITY AND BODY MASS INDEX (BMI) OF 34.0 TO 34.9 IN ADULT: Primary | ICD-10-CM

## 2025-08-20 DIAGNOSIS — E66.09 CLASS 1 OBESITY DUE TO EXCESS CALORIES WITH SERIOUS COMORBIDITY AND BODY MASS INDEX (BMI) OF 34.0 TO 34.9 IN ADULT: Primary | ICD-10-CM

## 2025-08-20 RX ORDER — DOXYCYCLINE HYCLATE 50 MG/1
50 CAPSULE, GELATIN COATED ORAL DAILY
Qty: 90 CAPSULE | Refills: 3 | Status: SHIPPED | OUTPATIENT
Start: 2025-08-20 | End: 2026-08-20

## 2025-08-20 RX ORDER — TIRZEPATIDE 2.5 MG/.5ML
2.5 INJECTION, SOLUTION SUBCUTANEOUS
Qty: 2 ML | Refills: 2 | Status: SHIPPED | OUTPATIENT
Start: 2025-08-20 | End: 2025-11-18

## 2025-08-20 RX ORDER — PHENTERMINE HYDROCHLORIDE 37.5 MG/1
18.75 TABLET ORAL
Qty: 30 TABLET | Refills: 0 | Status: SHIPPED | OUTPATIENT
Start: 2025-08-20 | End: 2025-09-19

## 2025-08-27 ENCOUNTER — TELEPHONE (OUTPATIENT)
Dept: FAMILY MEDICINE | Facility: CLINIC | Age: 55
End: 2025-08-27
Payer: COMMERCIAL